# Patient Record
Sex: FEMALE | Race: WHITE | NOT HISPANIC OR LATINO | Employment: UNEMPLOYED | URBAN - NONMETROPOLITAN AREA
[De-identification: names, ages, dates, MRNs, and addresses within clinical notes are randomized per-mention and may not be internally consistent; named-entity substitution may affect disease eponyms.]

---

## 2021-10-06 ENCOUNTER — APPOINTMENT (EMERGENCY)
Dept: CT IMAGING | Facility: HOSPITAL | Age: 46
End: 2021-10-06
Payer: COMMERCIAL

## 2021-10-06 ENCOUNTER — APPOINTMENT (EMERGENCY)
Dept: RADIOLOGY | Facility: HOSPITAL | Age: 46
End: 2021-10-06
Payer: COMMERCIAL

## 2021-10-06 ENCOUNTER — HOSPITAL ENCOUNTER (EMERGENCY)
Facility: HOSPITAL | Age: 46
Discharge: HOME | End: 2021-10-06
Attending: EMERGENCY MEDICINE
Payer: COMMERCIAL

## 2021-10-06 VITALS
HEIGHT: 63 IN | RESPIRATION RATE: 18 BRPM | BODY MASS INDEX: 39.69 KG/M2 | TEMPERATURE: 98.1 F | DIASTOLIC BLOOD PRESSURE: 80 MMHG | HEART RATE: 61 BPM | OXYGEN SATURATION: 94 % | WEIGHT: 224 LBS | SYSTOLIC BLOOD PRESSURE: 172 MMHG

## 2021-10-06 DIAGNOSIS — R11.2 NAUSEA AND VOMITING: ICD-10-CM

## 2021-10-06 DIAGNOSIS — F12.10 MARIJUANA ABUSE: ICD-10-CM

## 2021-10-06 DIAGNOSIS — K29.70 GASTRITIS: Primary | ICD-10-CM

## 2021-10-06 LAB
ALBUMIN SERPL BCP-MCNC: 4.1 G/DL (ref 3.5–5)
ALP SERPL-CCNC: 71 U/L (ref 46–116)
ALT SERPL W P-5'-P-CCNC: 27 U/L (ref 12–78)
AMPHETAMINES SERPL QL SCN: NEGATIVE
ANION GAP SERPL CALCULATED.3IONS-SCNC: 12 MMOL/L (ref 4–13)
AST SERPL W P-5'-P-CCNC: 20 U/L (ref 5–45)
BARBITURATES UR QL: NEGATIVE
BASOPHILS # BLD AUTO: 0.04 THOUSANDS/ΜL (ref 0–0.1)
BASOPHILS NFR BLD AUTO: 1 % (ref 0–1)
BENZODIAZ UR QL: NEGATIVE
BILIRUB SERPL-MCNC: 0.55 MG/DL (ref 0.2–1)
BILIRUB UR QL STRIP: ABNORMAL
BUN SERPL-MCNC: 34 MG/DL (ref 5–25)
CALCIUM SERPL-MCNC: 9.4 MG/DL (ref 8.3–10.1)
CHLORIDE SERPL-SCNC: 102 MMOL/L (ref 100–108)
CLARITY UR: CLEAR
CO2 SERPL-SCNC: 26 MMOL/L (ref 21–32)
COCAINE UR QL: NEGATIVE
COLOR UR: YELLOW
CREAT SERPL-MCNC: 1.16 MG/DL (ref 0.6–1.3)
EOSINOPHIL # BLD AUTO: 0.13 THOUSAND/ΜL (ref 0–0.61)
EOSINOPHIL NFR BLD AUTO: 2 % (ref 0–6)
ERYTHROCYTE [DISTWIDTH] IN BLOOD BY AUTOMATED COUNT: 18.6 % (ref 11.6–15.1)
GFR SERPL CREATININE-BSD FRML MDRD: 58 ML/MIN/1.73SQ M
GLUCOSE SERPL-MCNC: 107 MG/DL (ref 65–140)
GLUCOSE UR STRIP-MCNC: NEGATIVE MG/DL
HCT VFR BLD AUTO: 39.9 % (ref 34.8–46.1)
HGB BLD-MCNC: 12.4 G/DL (ref 11.5–15.4)
HGB UR QL STRIP.AUTO: NEGATIVE
IMM GRANULOCYTES # BLD AUTO: 0.01 THOUSAND/UL (ref 0–0.2)
IMM GRANULOCYTES NFR BLD AUTO: 0 % (ref 0–2)
KETONES UR STRIP-MCNC: ABNORMAL MG/DL
LACTATE SERPL-SCNC: 1.3 MMOL/L (ref 0.5–2)
LEUKOCYTE ESTERASE UR QL STRIP: NEGATIVE
LIPASE SERPL-CCNC: 91 U/L (ref 73–393)
LYMPHOCYTES # BLD AUTO: 1.9 THOUSANDS/ΜL (ref 0.6–4.47)
LYMPHOCYTES NFR BLD AUTO: 23 % (ref 14–44)
MAGNESIUM SERPL-MCNC: 2.7 MG/DL (ref 1.6–2.6)
MCH RBC QN AUTO: 25.2 PG (ref 26.8–34.3)
MCHC RBC AUTO-ENTMCNC: 31.1 G/DL (ref 31.4–37.4)
MCV RBC AUTO: 81 FL (ref 82–98)
METHADONE UR QL: NEGATIVE
MONOCYTES # BLD AUTO: 0.79 THOUSAND/ΜL (ref 0.17–1.22)
MONOCYTES NFR BLD AUTO: 10 % (ref 4–12)
NEUTROPHILS # BLD AUTO: 5.44 THOUSANDS/ΜL (ref 1.85–7.62)
NEUTS SEG NFR BLD AUTO: 64 % (ref 43–75)
NITRITE UR QL STRIP: NEGATIVE
NRBC BLD AUTO-RTO: 0 /100 WBCS
OPIATES UR QL SCN: NEGATIVE
OXYCODONE+OXYMORPHONE UR QL SCN: NEGATIVE
PCP UR QL: NEGATIVE
PH UR STRIP.AUTO: 6 [PH]
PLATELET # BLD AUTO: 450 THOUSANDS/UL (ref 149–390)
PMV BLD AUTO: 10.1 FL (ref 8.9–12.7)
POTASSIUM SERPL-SCNC: 3.9 MMOL/L (ref 3.5–5.3)
PROT SERPL-MCNC: 8.4 G/DL (ref 6.4–8.2)
PROT UR STRIP-MCNC: NEGATIVE MG/DL
RBC # BLD AUTO: 4.93 MILLION/UL (ref 3.81–5.12)
SODIUM SERPL-SCNC: 140 MMOL/L (ref 136–145)
SP GR UR STRIP.AUTO: 1.01 (ref 1–1.03)
THC UR QL: POSITIVE
UROBILINOGEN UR QL STRIP.AUTO: 0.2 E.U./DL
WBC # BLD AUTO: 8.31 THOUSAND/UL (ref 4.31–10.16)

## 2021-10-06 PROCEDURE — 96365 THER/PROPH/DIAG IV INF INIT: CPT

## 2021-10-06 PROCEDURE — 96376 TX/PRO/DX INJ SAME DRUG ADON: CPT

## 2021-10-06 PROCEDURE — 36415 COLL VENOUS BLD VENIPUNCTURE: CPT | Performed by: PHYSICIAN ASSISTANT

## 2021-10-06 PROCEDURE — 96375 TX/PRO/DX INJ NEW DRUG ADDON: CPT

## 2021-10-06 PROCEDURE — C9113 INJ PANTOPRAZOLE SODIUM, VIA: HCPCS | Performed by: PHYSICIAN ASSISTANT

## 2021-10-06 PROCEDURE — 85025 COMPLETE CBC W/AUTO DIFF WBC: CPT | Performed by: PHYSICIAN ASSISTANT

## 2021-10-06 PROCEDURE — 83690 ASSAY OF LIPASE: CPT | Performed by: PHYSICIAN ASSISTANT

## 2021-10-06 PROCEDURE — 71045 X-RAY EXAM CHEST 1 VIEW: CPT

## 2021-10-06 PROCEDURE — 83605 ASSAY OF LACTIC ACID: CPT | Performed by: PHYSICIAN ASSISTANT

## 2021-10-06 PROCEDURE — 74177 CT ABD & PELVIS W/CONTRAST: CPT

## 2021-10-06 PROCEDURE — 80307 DRUG TEST PRSMV CHEM ANLYZR: CPT | Performed by: PHYSICIAN ASSISTANT

## 2021-10-06 PROCEDURE — 80053 COMPREHEN METABOLIC PANEL: CPT | Performed by: PHYSICIAN ASSISTANT

## 2021-10-06 PROCEDURE — 99284 EMERGENCY DEPT VISIT MOD MDM: CPT

## 2021-10-06 PROCEDURE — 83735 ASSAY OF MAGNESIUM: CPT | Performed by: PHYSICIAN ASSISTANT

## 2021-10-06 PROCEDURE — 81003 URINALYSIS AUTO W/O SCOPE: CPT | Performed by: PHYSICIAN ASSISTANT

## 2021-10-06 PROCEDURE — 99284 EMERGENCY DEPT VISIT MOD MDM: CPT | Performed by: PHYSICIAN ASSISTANT

## 2021-10-06 RX ORDER — ONDANSETRON 4 MG/1
4 TABLET, ORALLY DISINTEGRATING ORAL EVERY 6 HOURS PRN
Qty: 20 TABLET | Refills: 0 | Status: SHIPPED | OUTPATIENT
Start: 2021-10-06

## 2021-10-06 RX ORDER — ONDANSETRON 2 MG/ML
4 INJECTION INTRAMUSCULAR; INTRAVENOUS ONCE
Status: COMPLETED | OUTPATIENT
Start: 2021-10-06 | End: 2021-10-06

## 2021-10-06 RX ADMIN — SODIUM CHLORIDE 1000 ML: 0.9 INJECTION, SOLUTION INTRAVENOUS at 18:44

## 2021-10-06 RX ADMIN — IOHEXOL 100 ML: 350 INJECTION, SOLUTION INTRAVENOUS at 19:41

## 2021-10-06 RX ADMIN — SODIUM CHLORIDE 80 MG: 9 INJECTION, SOLUTION INTRAVENOUS at 18:44

## 2021-10-06 RX ADMIN — ONDANSETRON 4 MG: 2 INJECTION INTRAMUSCULAR; INTRAVENOUS at 18:44

## 2021-10-06 RX ADMIN — ONDANSETRON 4 MG: 2 INJECTION INTRAMUSCULAR; INTRAVENOUS at 22:04

## 2022-08-28 ENCOUNTER — HOSPITAL ENCOUNTER (EMERGENCY)
Facility: HOSPITAL | Age: 47
Discharge: HOME | End: 2022-08-28
Attending: EMERGENCY MEDICINE
Payer: COMMERCIAL

## 2022-08-28 VITALS
OXYGEN SATURATION: 96 % | WEIGHT: 220 LBS | BODY MASS INDEX: 38.98 KG/M2 | DIASTOLIC BLOOD PRESSURE: 82 MMHG | SYSTOLIC BLOOD PRESSURE: 136 MMHG | HEIGHT: 63 IN | TEMPERATURE: 97.8 F | RESPIRATION RATE: 18 BRPM | HEART RATE: 70 BPM

## 2022-08-28 DIAGNOSIS — F12.90 CANNABINOID HYPEREMESIS SYNDROME: Primary | ICD-10-CM

## 2022-08-28 DIAGNOSIS — R11.2 CANNABINOID HYPEREMESIS SYNDROME: Primary | ICD-10-CM

## 2022-08-28 LAB
ALBUMIN SERPL BCP-MCNC: 4.5 G/DL (ref 3.5–5)
ALP SERPL-CCNC: 78 U/L (ref 46–116)
ALT SERPL W P-5'-P-CCNC: 17 U/L (ref 12–78)
ANION GAP SERPL CALCULATED.3IONS-SCNC: 12 MMOL/L (ref 4–13)
ANION GAP SERPL CALCULATED.3IONS-SCNC: 15 MMOL/L (ref 4–13)
AST SERPL W P-5'-P-CCNC: 8 U/L (ref 5–45)
BASOPHILS # BLD AUTO: 0.04 THOUSANDS/ΜL (ref 0–0.1)
BASOPHILS NFR BLD AUTO: 0 % (ref 0–1)
BETA-HYDROXYBUTYRATE: 0.5 MMOL/L
BILIRUB SERPL-MCNC: 0.51 MG/DL (ref 0.2–1)
BUN SERPL-MCNC: 45 MG/DL (ref 5–25)
BUN SERPL-MCNC: 56 MG/DL (ref 5–25)
CALCIUM SERPL-MCNC: 8.1 MG/DL (ref 8.3–10.1)
CALCIUM SERPL-MCNC: 9.6 MG/DL (ref 8.3–10.1)
CHLORIDE SERPL-SCNC: 104 MMOL/L (ref 96–108)
CHLORIDE SERPL-SCNC: 98 MMOL/L (ref 96–108)
CO2 SERPL-SCNC: 23 MMOL/L (ref 21–32)
CO2 SERPL-SCNC: 23 MMOL/L (ref 21–32)
CREAT SERPL-MCNC: 1.06 MG/DL (ref 0.6–1.3)
CREAT SERPL-MCNC: 1.57 MG/DL (ref 0.6–1.3)
EOSINOPHIL # BLD AUTO: 0.02 THOUSAND/ΜL (ref 0–0.61)
EOSINOPHIL NFR BLD AUTO: 0 % (ref 0–6)
ERYTHROCYTE [DISTWIDTH] IN BLOOD BY AUTOMATED COUNT: 19.5 % (ref 11.6–15.1)
GFR SERPL CREATININE-BSD FRML MDRD: 39 ML/MIN/1.73SQ M
GFR SERPL CREATININE-BSD FRML MDRD: 64 ML/MIN/1.73SQ M
GLUCOSE SERPL-MCNC: 105 MG/DL (ref 65–140)
GLUCOSE SERPL-MCNC: 133 MG/DL (ref 65–140)
GLUCOSE SERPL-MCNC: 144 MG/DL (ref 65–140)
HCT VFR BLD AUTO: 41.2 % (ref 34.8–46.1)
HGB BLD-MCNC: 12.4 G/DL (ref 11.5–15.4)
IMM GRANULOCYTES # BLD AUTO: 0.03 THOUSAND/UL (ref 0–0.2)
IMM GRANULOCYTES NFR BLD AUTO: 0 % (ref 0–2)
LIPASE SERPL-CCNC: 147 U/L (ref 73–393)
LYMPHOCYTES # BLD AUTO: 1.13 THOUSANDS/ΜL (ref 0.6–4.47)
LYMPHOCYTES NFR BLD AUTO: 12 % (ref 14–44)
MCH RBC QN AUTO: 22.5 PG (ref 26.8–34.3)
MCHC RBC AUTO-ENTMCNC: 30.1 G/DL (ref 31.4–37.4)
MCV RBC AUTO: 75 FL (ref 82–98)
MONOCYTES # BLD AUTO: 0.63 THOUSAND/ΜL (ref 0.17–1.22)
MONOCYTES NFR BLD AUTO: 7 % (ref 4–12)
NEUTROPHILS # BLD AUTO: 7.63 THOUSANDS/ΜL (ref 1.85–7.62)
NEUTS SEG NFR BLD AUTO: 81 % (ref 43–75)
NRBC BLD AUTO-RTO: 0 /100 WBCS
PLATELET # BLD AUTO: 564 THOUSANDS/UL (ref 149–390)
PMV BLD AUTO: 8.8 FL (ref 8.9–12.7)
POTASSIUM SERPL-SCNC: 3.5 MMOL/L (ref 3.5–5.3)
POTASSIUM SERPL-SCNC: 3.8 MMOL/L (ref 3.5–5.3)
PROT SERPL-MCNC: 9.8 G/DL (ref 6.4–8.4)
RBC # BLD AUTO: 5.51 MILLION/UL (ref 3.81–5.12)
SARS-COV-2 RNA RESP QL NAA+PROBE: NEGATIVE
SODIUM SERPL-SCNC: 136 MMOL/L (ref 135–147)
SODIUM SERPL-SCNC: 139 MMOL/L (ref 135–147)
WBC # BLD AUTO: 9.48 THOUSAND/UL (ref 4.31–10.16)

## 2022-08-28 PROCEDURE — 96375 TX/PRO/DX INJ NEW DRUG ADDON: CPT

## 2022-08-28 PROCEDURE — 82948 REAGENT STRIP/BLOOD GLUCOSE: CPT

## 2022-08-28 PROCEDURE — 99283 EMERGENCY DEPT VISIT LOW MDM: CPT

## 2022-08-28 PROCEDURE — U0003 INFECTIOUS AGENT DETECTION BY NUCLEIC ACID (DNA OR RNA); SEVERE ACUTE RESPIRATORY SYNDROME CORONAVIRUS 2 (SARS-COV-2) (CORONAVIRUS DISEASE [COVID-19]), AMPLIFIED PROBE TECHNIQUE, MAKING USE OF HIGH THROUGHPUT TECHNOLOGIES AS DESCRIBED BY CMS-2020-01-R: HCPCS | Performed by: EMERGENCY MEDICINE

## 2022-08-28 PROCEDURE — 99285 EMERGENCY DEPT VISIT HI MDM: CPT | Performed by: EMERGENCY MEDICINE

## 2022-08-28 PROCEDURE — 96376 TX/PRO/DX INJ SAME DRUG ADON: CPT

## 2022-08-28 PROCEDURE — 83690 ASSAY OF LIPASE: CPT | Performed by: EMERGENCY MEDICINE

## 2022-08-28 PROCEDURE — U0005 INFEC AGEN DETEC AMPLI PROBE: HCPCS | Performed by: EMERGENCY MEDICINE

## 2022-08-28 PROCEDURE — 96365 THER/PROPH/DIAG IV INF INIT: CPT

## 2022-08-28 PROCEDURE — 36415 COLL VENOUS BLD VENIPUNCTURE: CPT | Performed by: EMERGENCY MEDICINE

## 2022-08-28 PROCEDURE — 82010 KETONE BODYS QUAN: CPT | Performed by: EMERGENCY MEDICINE

## 2022-08-28 PROCEDURE — 96361 HYDRATE IV INFUSION ADD-ON: CPT

## 2022-08-28 PROCEDURE — 85025 COMPLETE CBC W/AUTO DIFF WBC: CPT | Performed by: EMERGENCY MEDICINE

## 2022-08-28 PROCEDURE — 80048 BASIC METABOLIC PNL TOTAL CA: CPT | Performed by: EMERGENCY MEDICINE

## 2022-08-28 PROCEDURE — C9113 INJ PANTOPRAZOLE SODIUM, VIA: HCPCS | Performed by: EMERGENCY MEDICINE

## 2022-08-28 PROCEDURE — 80053 COMPREHEN METABOLIC PANEL: CPT | Performed by: EMERGENCY MEDICINE

## 2022-08-28 RX ORDER — METOCLOPRAMIDE 10 MG/1
10 TABLET ORAL EVERY 6 HOURS PRN
Qty: 30 TABLET | Refills: 1 | Status: SHIPPED | OUTPATIENT
Start: 2022-08-28 | End: 2022-08-28 | Stop reason: SDUPTHER

## 2022-08-28 RX ORDER — HALOPERIDOL 5 MG/ML
2 INJECTION INTRAMUSCULAR ONCE
Status: COMPLETED | OUTPATIENT
Start: 2022-08-28 | End: 2022-08-28

## 2022-08-28 RX ORDER — HYDROXYZINE 50 MG/1
50 TABLET, FILM COATED ORAL 4 TIMES DAILY PRN
COMMUNITY
Start: 2022-04-29

## 2022-08-28 RX ORDER — OLANZAPINE 5 MG/1
TABLET ORAL
COMMUNITY
Start: 2022-07-28

## 2022-08-28 RX ORDER — PANTOPRAZOLE SODIUM 40 MG/10ML
40 INJECTION, POWDER, LYOPHILIZED, FOR SOLUTION INTRAVENOUS ONCE
Status: COMPLETED | OUTPATIENT
Start: 2022-08-28 | End: 2022-08-28

## 2022-08-28 RX ORDER — LAMOTRIGINE 200 MG/1
200 TABLET ORAL
COMMUNITY

## 2022-08-28 RX ORDER — TRAZODONE HYDROCHLORIDE 50 MG/1
50 TABLET ORAL
COMMUNITY

## 2022-08-28 RX ORDER — METOCLOPRAMIDE 10 MG/1
10 TABLET ORAL EVERY 6 HOURS PRN
Qty: 30 TABLET | Refills: 0 | Status: SHIPPED | OUTPATIENT
Start: 2022-08-28

## 2022-08-28 RX ORDER — DEXLANSOPRAZOLE 60 MG/1
60 CAPSULE, DELAYED RELEASE ORAL DAILY
COMMUNITY

## 2022-08-28 RX ORDER — POLYETHYLENE GLYCOL 3350 17 G/17G
17 POWDER, FOR SOLUTION ORAL DAILY
COMMUNITY
Start: 2022-06-03

## 2022-08-28 RX ORDER — HALOPERIDOL 5 MG/1
5 TABLET ORAL DAILY
COMMUNITY
Start: 2022-06-03

## 2022-08-28 RX ADMIN — PANTOPRAZOLE SODIUM 40 MG: 40 INJECTION, POWDER, FOR SOLUTION INTRAVENOUS at 08:52

## 2022-08-28 RX ADMIN — SODIUM CHLORIDE, SODIUM LACTATE, POTASSIUM CHLORIDE, AND CALCIUM CHLORIDE 1000 ML: .6; .31; .03; .02 INJECTION, SOLUTION INTRAVENOUS at 10:19

## 2022-08-28 RX ADMIN — SODIUM CHLORIDE 1000 ML: 0.9 INJECTION, SOLUTION INTRAVENOUS at 08:53

## 2022-08-28 RX ADMIN — HALOPERIDOL LACTATE 2 MG: 5 INJECTION, SOLUTION INTRAMUSCULAR at 08:52

## 2022-08-28 RX ADMIN — HALOPERIDOL LACTATE 2 MG: 5 INJECTION, SOLUTION INTRAMUSCULAR at 10:23

## 2022-08-28 NOTE — ED PROVIDER NOTES
History  Chief Complaint   Patient presents with    Vomiting     Pt is with history of vomiting  Pt states she was told prior it was in relation to her marijuana use       Patient is a 65-year-old chronic marijuana user who presents emergency room with chief complaint of persistent vomiting for several weeks  Patient continues to smoke marijuana per her own report  Patient reports to me that she has a history of mental health disorder  She uses marijuana  Is recreational not prescribed  Patient reports that she has been seen for candidiasis related hyperemesis before  She reports medications either are not helping or she is unable to tolerate them orally  Patient reports that she had been on Zofran and it had not helped  She reports that she was on Haldol and initially helped but now she cannot take the oral medications reports that she is not eaten in 3 weeks    She is complaining of some upper epigastric discomfort  Patient reports to me that she has a history of diabetes for which she was supposed to be on metformin but is not currently taking it  Patient is very tearful and appears uncomfortable  Patient reports thirst and feeling dehydrated    Patient is found running her head underneath the sink with warm water  She reports that this helps        History provided by:  Patient and spouse  Vomiting  Severity:  Severe  Timing:  Constant  Number of daily episodes:  Unable to specify  Quality:  Unable to specify  Able to tolerate:  Liquids and solids  Progression:  Worsening  Chronicity:  Chronic  Recent urination:  Decreased  Relieved by:  Nothing  Worsened by:  Food smell  Ineffective treatments:  Antiemetics  Associated symptoms: abdominal pain and myalgias    Associated symptoms: no arthralgias, no chills, no diarrhea, no fever and no headaches    Abdominal pain:     Location:  Epigastric    Quality: burning      Severity:  Unable to specify    Onset quality:  Unable to specify  Risk factors: diabetes    Risk factors: no alcohol use (Reports no current history ) and no sick contacts        Prior to Admission Medications   Prescriptions Last Dose Informant Patient Reported? Taking? OLANZapine (ZyPREXA) 5 mg tablet   Yes Yes   dexlansoprazole (DEXILANT) 60 MG capsule   Yes No   Sig: Take 60 mg by mouth daily   haloperidol (HALDOL) 5 mg tablet   Yes Yes   Sig: Take 5 mg by mouth daily   hydrOXYzine HCL (ATARAX) 50 mg tablet   Yes Yes   Sig: Take 50 mg by mouth 4 (four) times a day as needed   lamoTRIgine (LaMICtal) 200 MG tablet   Yes No   Sig: Take 200 mg by mouth   ondansetron (Zofran ODT) 4 mg disintegrating tablet   No No   Sig: Take 1 tablet (4 mg total) by mouth every 6 (six) hours as needed for nausea or vomiting   polyethylene glycol (GLYCOLAX) 17 GM/SCOOP powder   Yes Yes   Sig: Take 17 g by mouth daily   traZODone (DESYREL) 50 mg tablet   Yes No   Sig: Take 50 mg by mouth      Facility-Administered Medications: None       History reviewed  No pertinent past medical history  History reviewed  No pertinent surgical history  History reviewed  No pertinent family history  I have reviewed and agree with the history as documented  E-Cigarette/Vaping    E-Cigarette Use Never User      E-Cigarette/Vaping Substances     Social History     Tobacco Use    Smoking status: Former Smoker     Packs/day: 1 00     Types: Cigarettes    Smokeless tobacco: Never Used   Vaping Use    Vaping Use: Never used   Substance Use Topics    Alcohol use: Not Currently    Drug use: Yes     Types: Marijuana       Review of Systems   Constitutional: Positive for fatigue and unexpected weight change  Negative for activity change, chills, diaphoresis and fever  Eyes: Negative  Respiratory: Negative  Cardiovascular: Negative  Gastrointestinal: Positive for abdominal pain and vomiting  Negative for constipation, diarrhea and nausea  Endocrine: Positive for polydipsia   Negative for polyphagia and polyuria  Genitourinary: Negative for dysuria, flank pain and frequency  Musculoskeletal: Positive for myalgias  Negative for arthralgias and back pain  Skin: Negative  Negative for pallor and rash  Allergic/Immunologic: Negative  Neurological: Negative for headaches  Hematological: Negative  Psychiatric/Behavioral: Negative  All other systems reviewed and are negative  Physical Exam  Physical Exam  Vitals and nursing note reviewed  Constitutional:       General: She is awake  She is in acute distress  Appearance: Normal appearance  She is well-developed  She is obese  She is ill-appearing  She is not toxic-appearing  HENT:      Head: Normocephalic and atraumatic  Hair is normal       Jaw: No pain on movement  Right Ear: External ear normal       Left Ear: External ear normal       Nose: Nose normal    Eyes:      General: Lids are normal  No scleral icterus  Extraocular Movements: Extraocular movements intact  Pupils: Pupils are equal, round, and reactive to light  Cardiovascular:      Rate and Rhythm: Normal rate and regular rhythm  Heart sounds: Normal heart sounds  No murmur heard  Pulmonary:      Effort: Pulmonary effort is normal  No respiratory distress  Breath sounds: Normal breath sounds  No stridor  No wheezing or rales  Abdominal:      General: Abdomen is flat  There is no distension  Palpations: Abdomen is soft  Tenderness: There is abdominal tenderness in the epigastric area  There is no guarding  Musculoskeletal:         General: No deformity  Normal range of motion  Cervical back: Normal range of motion and neck supple  Skin:     General: Skin is warm and dry  Coloration: Skin is not jaundiced or pale  Findings: No rash  Neurological:      Mental Status: She is alert and oriented to person, place, and time  Mental status is at baseline  Cranial Nerves: No cranial nerve deficit     Psychiatric: Attention and Perception: Attention normal          Mood and Affect: Mood normal          Speech: Speech normal          Behavior: Behavior normal          Vital Signs  ED Triage Vitals   Temperature Pulse Respirations Blood Pressure SpO2   08/28/22 0836 08/28/22 0836 08/28/22 0836 08/28/22 0836 08/28/22 0836   97 8 °F (36 6 °C) 80 16 (!) 176/102 98 %      Temp src Heart Rate Source Patient Position - Orthostatic VS BP Location FiO2 (%)   -- 08/28/22 1024 -- 08/28/22 0836 --    Monitor  Right arm       Pain Score       08/28/22 0836       No Pain           Vitals:    08/28/22 0836 08/28/22 1024   BP: (!) 176/102 145/80   Pulse: 80 71         Visual Acuity      ED Medications  Medications   sodium chloride 0 9 % bolus 1,000 mL (0 mL Intravenous Stopped 8/28/22 1019)   pantoprazole (PROTONIX) injection 40 mg (40 mg Intravenous Given 8/28/22 0852)   haloperidol lactate (HALDOL) injection 2 mg (2 mg Intravenous Given 8/28/22 0852)   lactated ringers bolus 1,000 mL (1,000 mL Intravenous New Bag 8/28/22 1019)   haloperidol lactate (HALDOL) injection 2 mg (2 mg Intravenous Given 8/28/22 1023)       Diagnostic Studies  Results Reviewed     Procedure Component Value Units Date/Time    Basic metabolic panel [[id number]]  (Abnormal) Collected: 08/28/22 1154    Lab Status: Final result Specimen: Blood from Arm, Left Updated: 08/28/22 1236     Sodium 139 mmol/L      Potassium 3 5 mmol/L      Chloride 104 mmol/L      CO2 23 mmol/L      ANION GAP 12 mmol/L      BUN 45 mg/dL      Creatinine 1 06 mg/dL      Glucose 105 mg/dL      Calcium 8 1 mg/dL      eGFR 64 ml/min/1 73sq m     Narrative:      [facility] guidelines for Chronic Kidney Disease (CKD):     Stage 1 with normal or high GFR (GFR > 90 mL/min/1 73 square meters)    Stage 2 Mild CKD (GFR = 60-89 mL/min/1 73 square meters)    Stage 3A Moderate CKD (GFR = 45-59 mL/min/1 73 square meters)    Stage 3B Moderate CKD (GFR = 30-44 mL/min/1 73 square meters)    Stage 4 Severe CKD (GFR = 15-29 mL/min/1 73 square meters)    Stage 5 End Stage CKD (GFR <15 mL/min/1 73 square meters)  Note: GFR calculation is accurate only with a steady state creatinine    COVID only [[id number]]  (Normal) Collected: 08/28/22 0852    Lab Status: Final result Specimen: Nares from Nose Updated: 08/28/22 0951     SARS-CoV-2 Negative    Narrative:      FOR PEDIATRIC PATIENTS - copy/paste COVID Guidelines URL to browser: [email or web address]  [employer]    SARS-CoV-2 assay is a Nucleic Acid Amplification assay intended for the  qualitative detection of nucleic acid from SARS-CoV-2 in nasopharyngeal  swabs  Results are for the presumptive identification of SARS-CoV-2 RNA  Positive results are indicative of infection with SARS-CoV-2, the virus  causing COVID-19, but do not rule out bacterial infection or co-infection  with other viruses  Laboratories within the [facility] and its  territories are required to report all positive results to the appropriate  public health authorities  Negative results do not preclude SARS-CoV-2  infection and should not be used as the sole basis for treatment or other  patient management decisions  Negative results must be combined with  clinical observations, patient history, and epidemiological information  This test has not been FDA cleared or approved  This test has been authorized by FDA under an Emergency Use Authorization  (EUA)  This test is only authorized for the duration of time the  declaration that circumstances exist justifying the authorization of the  emergency use of an in vitro diagnostic tests for detection of SARS-CoV-2  virus and/or diagnosis of COVID-19 infection under section 564(b)(1) of  the Act, 21 U  S C  080ASC-8(C)(4), unless the authorization is terminated  or revoked sooner  The test has been validated but independent review by FDA  and CLIA is pending      Test performed using [employer] Gene[employer]pert: This RT-PCR assay targets N2,  a region unique to SARS-CoV-2  A conserved region in the E-gene was chosen  for pan-Sarbecovirus detection which includes SARS-CoV-2      Beta Hydroxybutyrate [[id number]]  (Normal) Collected: 08/28/22 0852    Lab Status: Final result Specimen: Blood from Arm, Left Updated: 08/28/22 0942     BETA-HYDROXYBUTYRATE 0 5 mmol/L     Comprehensive metabolic panel [[id number]]  (Abnormal) Collected: 08/28/22 0852    Lab Status: Final result Specimen: Blood from Arm, Left Updated: 08/28/22 0917     Sodium 136 mmol/L      Potassium 3 8 mmol/L      Chloride 98 mmol/L      CO2 23 mmol/L      ANION GAP 15 mmol/L      BUN 56 mg/dL      Creatinine 1 57 mg/dL      Glucose 144 mg/dL      Calcium 9 6 mg/dL      AST 8 U/L      ALT 17 U/L      Alkaline Phosphatase 78 U/L      Total Protein 9 8 g/dL      Albumin 4 5 g/dL      Total Bilirubin 0 51 mg/dL      eGFR 39 ml/min/1 73sq m     Narrative:      [place] guidelines for Chronic Kidney Disease (CKD):     Stage 1 with normal or high GFR (GFR > 90 mL/min/1 73 square meters)    Stage 2 Mild CKD (GFR = 60-89 mL/min/1 73 square meters)    Stage 3A Moderate CKD (GFR = 45-59 mL/min/1 73 square meters)    Stage 3B Moderate CKD (GFR = 30-44 mL/min/1 73 square meters)    Stage 4 Severe CKD (GFR = 15-29 mL/min/1 73 square meters)    Stage 5 End Stage CKD (GFR <15 mL/min/1 73 square meters)  Note: GFR calculation is accurate only with a steady state creatinine    Lipase [[id number]]  (Normal) Collected: 08/28/22 0852    Lab Status: Final result Specimen: Blood from Arm, Left Updated: 08/28/22 0917     Lipase 147 u/L     Fingerstick Glucose (POCT) [[id number]]  (Normal) Collected: 08/28/22 0857    Lab Status: Final result Updated: 08/28/22 0859     POC Glucose 133 mg/dl     CBC and differential [[id number]]  (Abnormal) Collected: 08/28/22 0852    Lab Status: Final result Specimen: Blood from Arm, Left Updated: 08/28/22 0859     WBC 9 48 Thousand/uL      RBC 5 51 Million/uL      Hemoglobin 12 4 g/dL      Hematocrit 41 2 %      MCV 75 fL      MCH 22 5 pg      MCHC 30 1 g/dL      RDW 19 5 %      MPV 8 8 fL      Platelets 664 Thousands/uL      nRBC 0 /100 WBCs      Neutrophils Relative 81 %      Immat GRANS % 0 %      Lymphocytes Relative 12 %      Monocytes Relative 7 %      Eosinophils Relative 0 %      Basophils Relative 0 %      Neutrophils Absolute 7 63 Thousands/µL      Immature Grans Absolute 0 03 Thousand/uL      Lymphocytes Absolute 1 13 Thousands/µL      Monocytes Absolute 0 63 Thousand/µL      Eosinophils Absolute 0 02 Thousand/µL      Basophils Absolute 0 04 Thousands/µL                  No orders to display              Procedures  Procedures         ED Course  ED Course as of 08/28/22 1243   Sun Aug 28, 2022   0945 Blood glucose 133     0923 Currently appears slightly more comfortable  0191 Lipase: 147   0924 Renal function and anion gap noted  Will continue rehydration and recheck labs  8017 Patient appears clinically improved  No further vomiting  Will rehydrate and recheck labs  1204 SARS-COV-2: Negative   1237 Labs improved with IV rehydration  Patient stable for discharge  Will change her medications to Reglan  1243 Discussed findings with patient and spouse at bedside  Patient will follow-up PCP start a light diet for next 24 hours then slowly advanced  SBIRT 20yo+    Flowsheet Row Most Recent Value   SBIRT (23 yo +)    In order to provide better care to our patients, we are screening all of our patients for alcohol and drug use  Would it be okay to ask you these screening questions? Yes Filed at: 08/28/2022 0455   Initial Alcohol Screen: US AUDIT-C     1  How often do you have a drink containing alcohol? 0 Filed at: 08/28/2022 0839   2  How many drinks containing alcohol do you have on a typical day you are drinking? 0 Filed at: 08/28/2022 0839   3a  Male UNDER 65:  How often do you have five or more drinks on one occasion? 0 Filed at: 08/28/2022 0839   3b  FEMALE Any Age, or MALE 65+: How often do you have 4 or more drinks on one occassion? 0 Filed at: 08/28/2022 0839   Audit-C Score 0 Filed at: 08/28/2022 0165   LEONARDO: How many times in the past year have you    Used an illegal drug or used a prescription medication for non-medical reasons? Never Filed at: 08/28/2022 2413                    MDM  Number of Diagnoses or Management Options  Cannabinoid hyperemesis syndrome: new and requires workup     Amount and/or Complexity of Data Reviewed  Clinical lab tests: ordered and reviewed  Decide to obtain previous medical records or to obtain history from someone other than the patient: yes    Risk of Complications, Morbidity, and/or Mortality  Presenting problems: moderate  Diagnostic procedures: moderate  Management options: moderate    Patient Progress  Patient progress: improved      Disposition  Final diagnoses:   Cannabinoid hyperemesis syndrome     Time reflects when diagnosis was documented in both MDM as applicable and the Disposition within this note     Time User Action Codes Description Comment    8/28/2022 12:38 PM Reji Gore Add [R11 2,  F12 90] Cannabinoid hyperemesis syndrome       ED Disposition     ED Disposition   Discharge    Condition   Stable    Date/Time   Sun Aug 28, 2022 12:37 PM    Comment   Joelle Dixon discharge to home/self care                 Follow-up Information     Follow up With Specialties Details Why [address] Yazan Justin, Nurse Practitioner In 2 weeks As needed [address]  [place] [address]  [phone]            Current Discharge Medication List      START taking these medications    Details   metoclopramide (Reglan) 10 mg tablet Take 1 tablet (10 mg total) by mouth every 6 (six) hours as needed (Nausea or vomiting)  Qty: 30 tablet, Refills: 0    Associated Diagnoses: Cannabinoid hyperemesis syndrome         CONTINUE these medications which have NOT CHANGED    Details   haloperidol (HALDOL) 5 mg tablet Take 5 mg by mouth daily      hydrOXYzine HCL (ATARAX) 50 mg tablet Take 50 mg by mouth 4 (four) times a day as needed      OLANZapine (ZyPREXA) 5 mg tablet       polyethylene glycol (GLYCOLAX) 17 GM/SCOOP powder Take 17 g by mouth daily      dexlansoprazole (DEXILANT) 60 MG capsule Take 60 mg by mouth daily      lamoTRIgine (LaMICtal) 200 MG tablet Take 200 mg by mouth      traZODone (DESYREL) 50 mg tablet Take 50 mg by mouth         STOP taking these medications       ondansetron (Zofran ODT) 4 mg disintegrating tablet Comments:   Reason for Stopping:               No discharge procedures on file      PDMP Review     None          ED Provider  Electronically Signed by           Cliff Herrera DO  08/28/22 [id number] Marcelo Wright DO  08/28/22 1542

## 2022-11-30 ENCOUNTER — HOSPITAL ENCOUNTER (INPATIENT)
Facility: HOSPITAL | Age: 47
LOS: 3 days | Discharge: HOME | End: 2022-12-03
Attending: EMERGENCY MEDICINE | Admitting: FAMILY MEDICINE

## 2022-11-30 DIAGNOSIS — N17.9 AKI (ACUTE KIDNEY INJURY) (HCC): Primary | ICD-10-CM

## 2022-11-30 DIAGNOSIS — K59.00 CONSTIPATION: ICD-10-CM

## 2022-11-30 DIAGNOSIS — F31.9 BIPOLAR 1 DISORDER (HCC): ICD-10-CM

## 2022-11-30 DIAGNOSIS — R11.2 CANNABINOID HYPEREMESIS SYNDROME: ICD-10-CM

## 2022-11-30 DIAGNOSIS — I16.9 HYPERTENSIVE CRISIS: ICD-10-CM

## 2022-11-30 DIAGNOSIS — F12.90 CANNABINOID HYPEREMESIS SYNDROME: ICD-10-CM

## 2022-11-30 PROBLEM — D75.839 THROMBOCYTOSIS: Status: ACTIVE | Noted: 2022-11-30

## 2022-11-30 PROBLEM — E87.1 ACUTE HYPONATREMIA: Status: ACTIVE | Noted: 2022-11-30

## 2022-11-30 LAB
ALBUMIN SERPL BCP-MCNC: 4.9 G/DL (ref 3.5–5)
ALP SERPL-CCNC: 84 U/L (ref 46–116)
ALT SERPL W P-5'-P-CCNC: 20 U/L (ref 12–78)
ANION GAP SERPL CALCULATED.3IONS-SCNC: 17 MMOL/L (ref 4–13)
APTT PPP: 25 SECONDS (ref 23–37)
AST SERPL W P-5'-P-CCNC: 12 U/L (ref 5–45)
ATRIAL RATE: 72 BPM
B-HCG SERPL-ACNC: <1 MIU/ML (ref 0–11.6)
BASOPHILS # BLD AUTO: 0.04 THOUSANDS/ÂΜL (ref 0–0.1)
BASOPHILS NFR BLD AUTO: 0 % (ref 0–1)
BILIRUB SERPL-MCNC: 0.49 MG/DL (ref 0.2–1)
BUN SERPL-MCNC: 48 MG/DL (ref 5–25)
CALCIUM SERPL-MCNC: 9.7 MG/DL (ref 8.3–10.1)
CHLORIDE SERPL-SCNC: 93 MMOL/L (ref 96–108)
CO2 SERPL-SCNC: 22 MMOL/L (ref 21–32)
CREAT SERPL-MCNC: 2.15 MG/DL (ref 0.6–1.3)
EOSINOPHIL # BLD AUTO: 0 THOUSAND/ÂΜL (ref 0–0.61)
EOSINOPHIL NFR BLD AUTO: 0 % (ref 0–6)
ERYTHROCYTE [DISTWIDTH] IN BLOOD BY AUTOMATED COUNT: 19.9 % (ref 11.6–15.1)
GFR SERPL CREATININE-BSD FRML MDRD: 27 ML/MIN/1.73SQ M
GLUCOSE SERPL-MCNC: 143 MG/DL (ref 65–140)
HCT VFR BLD AUTO: 42.7 % (ref 34.8–46.1)
HGB BLD-MCNC: 13.1 G/DL (ref 11.5–15.4)
IMM GRANULOCYTES # BLD AUTO: 0.04 THOUSAND/UL (ref 0–0.2)
IMM GRANULOCYTES NFR BLD AUTO: 0 % (ref 0–2)
INR PPP: 1 (ref 0.84–1.19)
LACTATE SERPL-SCNC: 1.8 MMOL/L (ref 0.5–2)
LIPASE SERPL-CCNC: 156 U/L (ref 73–393)
LYMPHOCYTES # BLD AUTO: 1.82 THOUSANDS/ÂΜL (ref 0.6–4.47)
LYMPHOCYTES NFR BLD AUTO: 14 % (ref 14–44)
MCH RBC QN AUTO: 22.5 PG (ref 26.8–34.3)
MCHC RBC AUTO-ENTMCNC: 30.7 G/DL (ref 31.4–37.4)
MCV RBC AUTO: 74 FL (ref 82–98)
MONOCYTES # BLD AUTO: 1.19 THOUSAND/ÂΜL (ref 0.17–1.22)
MONOCYTES NFR BLD AUTO: 9 % (ref 4–12)
NEUTROPHILS # BLD AUTO: 10.12 THOUSANDS/ÂΜL (ref 1.85–7.62)
NEUTS SEG NFR BLD AUTO: 77 % (ref 43–75)
NRBC BLD AUTO-RTO: 0 /100 WBCS
P AXIS: -16 DEGREES
PLATELET # BLD AUTO: 601 THOUSANDS/UL (ref 149–390)
PMV BLD AUTO: 8.9 FL (ref 8.9–12.7)
POTASSIUM SERPL-SCNC: 3.9 MMOL/L (ref 3.5–5.3)
PR INTERVAL: 160 MS
PROT SERPL-MCNC: 10.6 G/DL (ref 6.4–8.4)
PROTHROMBIN TIME: 13.3 SECONDS (ref 11.6–14.5)
QRS AXIS: -19 DEGREES
QRSD INTERVAL: 80 MS
QT INTERVAL: 378 MS
QTC INTERVAL: 413 MS
RBC # BLD AUTO: 5.81 MILLION/UL (ref 3.81–5.12)
SODIUM SERPL-SCNC: 132 MMOL/L (ref 135–147)
T WAVE AXIS: -10 DEGREES
VENTRICULAR RATE: 72 BPM
WBC # BLD AUTO: 13.21 THOUSAND/UL (ref 4.31–10.16)

## 2022-11-30 RX ORDER — PANTOPRAZOLE SODIUM 40 MG/1
40 TABLET, DELAYED RELEASE ORAL
Status: DISCONTINUED | OUTPATIENT
Start: 2022-12-01 | End: 2022-12-03 | Stop reason: HOSPADM

## 2022-11-30 RX ORDER — TRAZODONE HYDROCHLORIDE 50 MG/1
50 TABLET ORAL
Status: DISCONTINUED | OUTPATIENT
Start: 2022-11-30 | End: 2022-12-03 | Stop reason: HOSPADM

## 2022-11-30 RX ORDER — METOCLOPRAMIDE HYDROCHLORIDE 5 MG/ML
10 INJECTION INTRAMUSCULAR; INTRAVENOUS ONCE
Status: COMPLETED | OUTPATIENT
Start: 2022-11-30 | End: 2022-11-30

## 2022-11-30 RX ORDER — DIPHENHYDRAMINE HYDROCHLORIDE 50 MG/ML
25 INJECTION INTRAMUSCULAR; INTRAVENOUS ONCE
Status: COMPLETED | OUTPATIENT
Start: 2022-11-30 | End: 2022-11-30

## 2022-11-30 RX ORDER — PANTOPRAZOLE SODIUM 40 MG/10ML
40 INJECTION, POWDER, LYOPHILIZED, FOR SOLUTION INTRAVENOUS ONCE
Status: COMPLETED | OUTPATIENT
Start: 2022-11-30 | End: 2022-11-30

## 2022-11-30 RX ORDER — HALOPERIDOL 5 MG/ML
5 INJECTION INTRAMUSCULAR ONCE
Status: COMPLETED | OUTPATIENT
Start: 2022-11-30 | End: 2022-11-30

## 2022-11-30 RX ORDER — LAMOTRIGINE 100 MG/1
200 TABLET ORAL
Status: DISCONTINUED | OUTPATIENT
Start: 2022-11-30 | End: 2022-12-03 | Stop reason: HOSPADM

## 2022-11-30 RX ORDER — SODIUM CHLORIDE, SODIUM GLUCONATE, SODIUM ACETATE, POTASSIUM CHLORIDE, MAGNESIUM CHLORIDE, SODIUM PHOSPHATE, DIBASIC, AND POTASSIUM PHOSPHATE .53; .5; .37; .037; .03; .012; .00082 G/100ML; G/100ML; G/100ML; G/100ML; G/100ML; G/100ML; G/100ML
125 INJECTION, SOLUTION INTRAVENOUS CONTINUOUS
Status: DISCONTINUED | OUTPATIENT
Start: 2022-11-30 | End: 2022-12-01

## 2022-11-30 RX ORDER — LORAZEPAM 2 MG/ML
1 INJECTION INTRAMUSCULAR EVERY 6 HOURS PRN
Status: DISCONTINUED | OUTPATIENT
Start: 2022-11-30 | End: 2022-12-03 | Stop reason: HOSPADM

## 2022-11-30 RX ORDER — HYDROXYZINE HYDROCHLORIDE 25 MG/1
50 TABLET, FILM COATED ORAL 4 TIMES DAILY PRN
Status: DISCONTINUED | OUTPATIENT
Start: 2022-11-30 | End: 2022-12-03 | Stop reason: HOSPADM

## 2022-11-30 RX ORDER — ENOXAPARIN SODIUM 100 MG/ML
40 INJECTION SUBCUTANEOUS DAILY
Status: DISCONTINUED | OUTPATIENT
Start: 2022-12-01 | End: 2022-12-03 | Stop reason: HOSPADM

## 2022-11-30 RX ORDER — HALOPERIDOL 5 MG/1
5 TABLET ORAL DAILY
Status: DISCONTINUED | OUTPATIENT
Start: 2022-12-01 | End: 2022-11-30

## 2022-11-30 RX ORDER — ONDANSETRON 2 MG/ML
4 INJECTION INTRAMUSCULAR; INTRAVENOUS EVERY 4 HOURS PRN
Status: DISCONTINUED | OUTPATIENT
Start: 2022-11-30 | End: 2022-12-01

## 2022-11-30 RX ORDER — LORAZEPAM 2 MG/ML
1 INJECTION INTRAMUSCULAR EVERY 6 HOURS PRN
Status: DISCONTINUED | OUTPATIENT
Start: 2022-11-30 | End: 2022-11-30

## 2022-11-30 RX ORDER — HYDROMORPHONE HCL/PF 1 MG/ML
0.5 SYRINGE (ML) INJECTION ONCE
Status: COMPLETED | OUTPATIENT
Start: 2022-11-30 | End: 2022-11-30

## 2022-11-30 RX ORDER — OLANZAPINE 2.5 MG/1
5 TABLET ORAL
Status: DISCONTINUED | OUTPATIENT
Start: 2022-11-30 | End: 2022-12-02

## 2022-11-30 RX ADMIN — SODIUM CHLORIDE 1000 ML: 0.9 INJECTION, SOLUTION INTRAVENOUS at 21:35

## 2022-11-30 RX ADMIN — SODIUM CHLORIDE, SODIUM GLUCONATE, SODIUM ACETATE, POTASSIUM CHLORIDE, MAGNESIUM CHLORIDE, SODIUM PHOSPHATE, DIBASIC, AND POTASSIUM PHOSPHATE 125 ML/HR: .53; .5; .37; .037; .03; .012; .00082 INJECTION, SOLUTION INTRAVENOUS at 23:32

## 2022-11-30 RX ADMIN — HYDROMORPHONE HYDROCHLORIDE 0.5 MG: 1 INJECTION, SOLUTION INTRAMUSCULAR; INTRAVENOUS; SUBCUTANEOUS at 22:29

## 2022-11-30 RX ADMIN — TRAZODONE HYDROCHLORIDE 50 MG: 50 TABLET ORAL at 22:28

## 2022-11-30 RX ADMIN — SODIUM CHLORIDE 1000 ML: 0.9 INJECTION, SOLUTION INTRAVENOUS at 18:43

## 2022-11-30 RX ADMIN — PANTOPRAZOLE SODIUM 40 MG: 40 INJECTION, POWDER, FOR SOLUTION INTRAVENOUS at 18:46

## 2022-11-30 RX ADMIN — HALOPERIDOL LACTATE 5 MG: 5 INJECTION, SOLUTION INTRAMUSCULAR at 22:29

## 2022-11-30 RX ADMIN — LAMOTRIGINE 200 MG: 100 TABLET ORAL at 22:28

## 2022-11-30 RX ADMIN — OLANZAPINE 5 MG: 2.5 TABLET, FILM COATED ORAL at 22:28

## 2022-11-30 RX ADMIN — DIPHENHYDRAMINE HYDROCHLORIDE 25 MG: 50 INJECTION, SOLUTION INTRAMUSCULAR; INTRAVENOUS at 18:47

## 2022-11-30 RX ADMIN — HALOPERIDOL LACTATE 5 MG: 5 INJECTION, SOLUTION INTRAMUSCULAR at 18:49

## 2022-11-30 RX ADMIN — METOCLOPRAMIDE 10 MG: 5 INJECTION, SOLUTION INTRAMUSCULAR; INTRAVENOUS at 22:29

## 2022-11-30 RX ADMIN — DIPHENHYDRAMINE HYDROCHLORIDE 25 MG: 50 INJECTION, SOLUTION INTRAMUSCULAR; INTRAVENOUS at 22:29

## 2022-11-30 NOTE — ED PROVIDER NOTES
History  Chief Complaint   Patient presents with   • Vomiting     Pt has CHS and has been vomiting x several days  Pt last smoked at 0800 today  Pt has pain everywhere  Patient with a history of cannabinoid hyperemesis syndrome  Has been smoking marijuana  Now with nausea and vomiting for several days  Feels weak and tired  Has been here in the past for same  No recent cold symptoms  History provided by:  Patient   used: No    Vomiting  Severity:  Mild  Timing:  Constant  Progression:  Unchanged  Chronicity:  Recurrent  Recent urination:  Normal  Relieved by:  Nothing  Worsened by:  Nothing  Ineffective treatments:  None tried  Associated symptoms: abdominal pain    Associated symptoms: no chills, no cough, no diarrhea, no fever, no headaches and no sore throat        Prior to Admission Medications   Prescriptions Last Dose Informant Patient Reported? Taking? OLANZapine (ZyPREXA) 5 mg tablet   Yes No   dexlansoprazole (DEXILANT) 60 MG capsule   Yes No   Sig: Take 60 mg by mouth daily   haloperidol (HALDOL) 5 mg tablet   Yes No   Sig: Take 5 mg by mouth daily   hydrOXYzine HCL (ATARAX) 50 mg tablet   Yes No   Sig: Take 50 mg by mouth 4 (four) times a day as needed   lamoTRIgine (LaMICtal) 200 MG tablet   Yes No   Sig: Take 200 mg by mouth   metoclopramide (Reglan) 10 mg tablet   No No   Sig: Take 1 tablet (10 mg total) by mouth every 6 (six) hours as needed (Nausea or vomiting)   polyethylene glycol (GLYCOLAX) 17 GM/SCOOP powder   Yes No   Sig: Take 17 g by mouth daily   traZODone (DESYREL) 50 mg tablet   Yes No   Sig: Take 50 mg by mouth      Facility-Administered Medications: None       History reviewed  No pertinent past medical history  History reviewed  No pertinent surgical history  History reviewed  No pertinent family history  I have reviewed and agree with the history as documented      E-Cigarette/Vaping   • E-Cigarette Use Never User      E-Cigarette/Vaping Substances     Social History     Tobacco Use   • Smoking status: Former     Packs/day: 1 00     Types: Cigarettes   • Smokeless tobacco: Never   Vaping Use   • Vaping Use: Never used   Substance Use Topics   • Alcohol use: Not Currently   • Drug use: Yes     Types: Marijuana       Review of Systems   Constitutional: Negative for chills and fever  HENT: Negative for ear pain, hearing loss, sore throat, trouble swallowing and voice change  Eyes: Negative for pain and discharge  Respiratory: Negative for cough, shortness of breath and wheezing  Cardiovascular: Negative for chest pain and palpitations  Gastrointestinal: Positive for abdominal pain and vomiting  Negative for blood in stool, constipation, diarrhea and nausea  Genitourinary: Negative for dysuria, flank pain, frequency and hematuria  Musculoskeletal: Negative for joint swelling, neck pain and neck stiffness  Skin: Negative for rash and wound  Neurological: Negative for dizziness, seizures, syncope, facial asymmetry and headaches  Psychiatric/Behavioral: Negative for hallucinations, self-injury and suicidal ideas  All other systems reviewed and are negative  Physical Exam  Physical Exam  Vitals and nursing note reviewed  Constitutional:       General: She is not in acute distress  Appearance: She is well-developed  HENT:      Head: Normocephalic and atraumatic  Right Ear: External ear normal       Left Ear: External ear normal    Eyes:      General: No scleral icterus  Right eye: No discharge  Left eye: No discharge  Extraocular Movements: Extraocular movements intact  Conjunctiva/sclera: Conjunctivae normal    Cardiovascular:      Rate and Rhythm: Normal rate and regular rhythm  Heart sounds: Normal heart sounds  No murmur heard  Pulmonary:      Effort: Pulmonary effort is normal       Breath sounds: Normal breath sounds  No wheezing or rales     Abdominal:      General: Bowel sounds are normal  There is no distension  Palpations: Abdomen is soft  Tenderness: There is abdominal tenderness (Minimally tender in the epigastric region  )  There is no guarding or rebound  Musculoskeletal:         General: No deformity  Normal range of motion  Cervical back: Normal range of motion and neck supple  Skin:     General: Skin is warm and dry  Findings: No rash  Neurological:      General: No focal deficit present  Mental Status: She is alert and oriented to person, place, and time  Cranial Nerves: No cranial nerve deficit  Psychiatric:         Mood and Affect: Mood normal          Behavior: Behavior normal          Thought Content:  Thought content normal          Judgment: Judgment normal          Vital Signs  ED Triage Vitals [11/30/22 1747]   Temperature Pulse Respirations Blood Pressure SpO2   98 5 °F (36 9 °C) 90 19 (!) 189/114 96 %      Temp Source Heart Rate Source Patient Position - Orthostatic VS BP Location FiO2 (%)   Temporal Monitor Sitting Right arm --      Pain Score       10 - Worst Possible Pain           Vitals:    11/30/22 1747   BP: (!) 189/114   Pulse: 90   Patient Position - Orthostatic VS: Sitting         Visual Acuity      ED Medications  Medications   sodium chloride 0 9 % bolus 1,000 mL (1,000 mL Intravenous New Bag 11/30/22 1843)   pantoprazole (PROTONIX) injection 40 mg (40 mg Intravenous Given 11/30/22 1846)   haloperidol lactate (HALDOL) injection 5 mg (5 mg Intramuscular Given 11/30/22 1849)   diphenhydrAMINE (BENADRYL) injection 25 mg (25 mg Intravenous Given 11/30/22 1847)       Diagnostic Studies  Results Reviewed     Procedure Component Value Units Date/Time    hCG, quantitative [[id number]]  (Normal) Collected: 11/30/22 1845    Lab Status: Final result Specimen: Blood from Arm, Left Updated: 11/30/22 1941     HCG, Quant <1 mIU/mL     Narrative:       Expected Ranges:     Approximate               Approximate HCG  Gestation age Concentration ( mIU/mL)  _____________          ______________________   Ileene Barter                      HCG values  0 2-1                       5-50  1-2                           2-3                         100-5000  3-4                         500-13546  4-5                         1000-38015  5-6                         38986-528106  6-8                         42083-580289  8-12                        25842-313810      Protime-INR [[id number]]  (Normal) Collected: 11/30/22 1845    Lab Status: Final result Specimen: Blood from Arm, Left Updated: 11/30/22 1918     Protime 13 3 seconds      INR 1 00    APTT [[id number]]  (Normal) Collected: 11/30/22 1845    Lab Status: Final result Specimen: Blood from Arm, Left Updated: 11/30/22 1918     PTT 25 seconds     Lactic acid [[id number]]  (Normal) Collected: 11/30/22 1845    Lab Status: Final result Specimen: Blood from Arm, Left Updated: 11/30/22 1917     LACTIC ACID 1 8 mmol/L     Narrative:      Result may be elevated if tourniquet was used during collection      Comprehensive metabolic panel [[id number]]  (Abnormal) Collected: 11/30/22 1845    Lab Status: Final result Specimen: Blood from Arm, Left Updated: 11/30/22 1916     Sodium 132 mmol/L      Potassium 3 9 mmol/L      Chloride 93 mmol/L      CO2 22 mmol/L      ANION GAP 17 mmol/L      BUN 48 mg/dL      Creatinine 2 15 mg/dL      Glucose 143 mg/dL      Calcium 9 7 mg/dL      AST 12 U/L      ALT 20 U/L      Alkaline Phosphatase 84 U/L      Total Protein 10 6 g/dL      Albumin 4 9 g/dL      Total Bilirubin 0 49 mg/dL      eGFR 27 ml/min/1 73sq m     Narrative:      [facility] guidelines for Chronic Kidney Disease (CKD):   •  Stage 1 with normal or high GFR (GFR > 90 mL/min/1 73 square meters)  •  Stage 2 Mild CKD (GFR = 60-89 mL/min/1 73 square meters)  •  Stage 3A Moderate CKD (GFR = 45-59 mL/min/1 73 square meters)  •  Stage 3B Moderate CKD (GFR = 30-44 mL/min/1 73 square meters)  •  Stage 4 Severe CKD (GFR = 15-29 mL/min/1 73 square meters)  •  Stage 5 End Stage CKD (GFR <15 mL/min/1 73 square meters)  Note: GFR calculation is accurate only with a steady state creatinine    Lipase [[id number]]  (Normal) Collected: 11/30/22 1845    Lab Status: Final result Specimen: Blood from Arm, Left Updated: 11/30/22 1916     Lipase 156 u/L     CBC and differential [[id number]]  (Abnormal) Collected: 11/30/22 1845    Lab Status: Final result Specimen: Blood from Arm, Left Updated: 11/30/22 1855     WBC 13 21 Thousand/uL      RBC 5 81 Million/uL      Hemoglobin 13 1 g/dL      Hematocrit 42 7 %      MCV 74 fL      MCH 22 5 pg      MCHC 30 7 g/dL      RDW 19 9 %      MPV 8 9 fL      Platelets 161 Thousands/uL      nRBC 0 /100 WBCs      Neutrophils Relative 77 %      Immat GRANS % 0 %      Lymphocytes Relative 14 %      Monocytes Relative 9 %      Eosinophils Relative 0 %      Basophils Relative 0 %      Neutrophils Absolute 10 12 Thousands/µL      Immature Grans Absolute 0 04 Thousand/uL      Lymphocytes Absolute 1 82 Thousands/µL      Monocytes Absolute 1 19 Thousand/µL      Eosinophils Absolute 0 00 Thousand/µL      Basophils Absolute 0 04 Thousands/µL                  No orders to display              Procedures  ECG 12 Lead Documentation Only    Date/Time: 11/30/2022 5:57 PM  Performed by: Lazarus Christine, MD  Authorized by: Lazarus Christine, MD     ECG reviewed by me, the ED Provider: yes    Patient location:  ED  Rate:     ECG rate:  70  Rhythm:     Rhythm: sinus rhythm    Ectopy:     Ectopy: none    QRS:     QRS axis:  Normal             ED Course                               SBIRT 20yo+    Flowsheet Row Most Recent Value   SBIRT (25 yo +)    In order to provide better care to our patients, we are screening all of our patients for alcohol and drug use  Would it be okay to ask you these screening questions?  No Filed at: 11/30/2022 1749                    MDM  Number of Diagnoses or Management Options     Amount and/or Complexity of Data Reviewed  Clinical lab tests: reviewed  Review and summarize past medical records: yes        Disposition  Final diagnoses:   LEY[facility] (acute [facility]) ([facility] )   Cannabinoid hyperemesis syndrome     Time reflects when diagnosis was documented in both MDM as applicable and the Disposition within this note     Time User Action Codes Description Comment    11/30/2022  7:21 PM Jose Price Add [N17 9] LEYDA (acute kidney injury) ([facility] )     11/30/2022  7:22 PM Luis Alfredo Price Add [R11 2,  F12 90] Cannabinoid hyperemesis syndrome       ED Disposition     ED Disposition   Admit    Condition   Stable    Date/Time   Wed Nov 30, 2022  7:23 PM    Comment   Case was discussed with Dr Lul Rice and the patient's admission status was agreed to be  to the service of Dr Lul Rice   Follow-up Information    None         Patient's Medications   Discharge Prescriptions    No medications on file       No discharge procedures on file      PDMP Review     None          ED Provider  Electronically Signed by           Joanie Orellana MD  11/30/22 6728

## 2022-12-01 PROBLEM — F31.9 BIPOLAR 1 DISORDER (HCC): Status: ACTIVE | Noted: 2022-12-01

## 2022-12-01 PROBLEM — F19.10 POLYSUBSTANCE ABUSE (HCC): Status: ACTIVE | Noted: 2022-12-01

## 2022-12-01 PROBLEM — R93.89 IMAGING ABNORMALITIES: Status: ACTIVE | Noted: 2022-12-01

## 2022-12-01 PROBLEM — T21.23XA: Status: ACTIVE | Noted: 2022-12-01

## 2022-12-01 PROBLEM — Z87.09 HISTORY OF COPD: Status: ACTIVE | Noted: 2022-12-01

## 2022-12-01 PROBLEM — Z86.39 HISTORY OF DIET-CONTROLLED DIABETES: Status: ACTIVE | Noted: 2022-12-01

## 2022-12-01 PROBLEM — Z86.39 HISTORY OF HYPERLIPIDEMIA: Status: ACTIVE | Noted: 2022-12-01

## 2022-12-01 LAB
AMORPH URATE CRY URNS QL MICRO: ABNORMAL /HPF
ANION GAP SERPL CALCULATED.3IONS-SCNC: 10 MMOL/L (ref 4–13)
BACTERIA UR QL AUTO: ABNORMAL /HPF
BILIRUB UR QL STRIP: ABNORMAL
BUN SERPL-MCNC: 38 MG/DL (ref 5–25)
CALCIUM SERPL-MCNC: 8.2 MG/DL (ref 8.3–10.1)
CAOX CRY URNS QL MICRO: ABNORMAL /HPF
CHLORIDE SERPL-SCNC: 102 MMOL/L (ref 96–108)
CLARITY UR: CLEAR
CO2 SERPL-SCNC: 24 MMOL/L (ref 21–32)
COLOR UR: YELLOW
CREAT SERPL-MCNC: 1.04 MG/DL (ref 0.6–1.3)
ERYTHROCYTE [DISTWIDTH] IN BLOOD BY AUTOMATED COUNT: 18.9 % (ref 11.6–15.1)
GFR SERPL CREATININE-BSD FRML MDRD: 65 ML/MIN/1.73SQ M
GLUCOSE SERPL-MCNC: 108 MG/DL (ref 65–140)
GLUCOSE UR STRIP-MCNC: NEGATIVE MG/DL
HCT VFR BLD AUTO: 33.9 % (ref 34.8–46.1)
HGB BLD-MCNC: 10.4 G/DL (ref 11.5–15.4)
HGB UR QL STRIP.AUTO: NEGATIVE
HYALINE CASTS #/AREA URNS LPF: ABNORMAL /LPF
KETONES UR STRIP-MCNC: NEGATIVE MG/DL
LEUKOCYTE ESTERASE UR QL STRIP: NEGATIVE
MAGNESIUM SERPL-MCNC: 2.5 MG/DL (ref 1.6–2.6)
MCH RBC QN AUTO: 23 PG (ref 26.8–34.3)
MCHC RBC AUTO-ENTMCNC: 30.7 G/DL (ref 31.4–37.4)
MCV RBC AUTO: 75 FL (ref 82–98)
MUCOUS THREADS UR QL AUTO: ABNORMAL
NITRITE UR QL STRIP: NEGATIVE
NON-SQ EPI CELLS URNS QL MICRO: ABNORMAL /HPF
PH UR STRIP.AUTO: 5.5 [PH]
PLATELET # BLD AUTO: 348 THOUSANDS/UL (ref 149–390)
PMV BLD AUTO: 8.9 FL (ref 8.9–12.7)
POTASSIUM SERPL-SCNC: 3.6 MMOL/L (ref 3.5–5.3)
PROT UR STRIP-MCNC: ABNORMAL MG/DL
RBC # BLD AUTO: 4.53 MILLION/UL (ref 3.81–5.12)
RBC #/AREA URNS AUTO: ABNORMAL /HPF
SODIUM SERPL-SCNC: 136 MMOL/L (ref 135–147)
SP GR UR STRIP.AUTO: >=1.03 (ref 1–1.03)
UROBILINOGEN UR QL STRIP.AUTO: 0.2 E.U./DL
WBC # BLD AUTO: 8.46 THOUSAND/UL (ref 4.31–10.16)
WBC #/AREA URNS AUTO: ABNORMAL /HPF

## 2022-12-01 RX ORDER — METOCLOPRAMIDE HYDROCHLORIDE 5 MG/ML
10 INJECTION INTRAMUSCULAR; INTRAVENOUS EVERY 6 HOURS PRN
Status: DISCONTINUED | OUTPATIENT
Start: 2022-12-01 | End: 2022-12-02

## 2022-12-01 RX ORDER — IPRATROPIUM BROMIDE AND ALBUTEROL SULFATE 2.5; .5 MG/3ML; MG/3ML
3 SOLUTION RESPIRATORY (INHALATION) EVERY 6 HOURS PRN
Status: DISCONTINUED | OUTPATIENT
Start: 2022-12-01 | End: 2022-12-03 | Stop reason: HOSPADM

## 2022-12-01 RX ORDER — GINSENG 100 MG
1 CAPSULE ORAL 2 TIMES DAILY
Status: DISCONTINUED | OUTPATIENT
Start: 2022-12-01 | End: 2022-12-03 | Stop reason: HOSPADM

## 2022-12-01 RX ADMIN — ONDANSETRON 4 MG: 2 INJECTION INTRAMUSCULAR; INTRAVENOUS at 09:08

## 2022-12-01 RX ADMIN — SODIUM CHLORIDE, SODIUM GLUCONATE, SODIUM ACETATE, POTASSIUM CHLORIDE, MAGNESIUM CHLORIDE, SODIUM PHOSPHATE, DIBASIC, AND POTASSIUM PHOSPHATE 125 ML/HR: .53; .5; .37; .037; .03; .012; .00082 INJECTION, SOLUTION INTRAVENOUS at 06:30

## 2022-12-01 RX ADMIN — BACITRACIN 1 LARGE APPLICATION: 500 OINTMENT TOPICAL at 16:36

## 2022-12-01 RX ADMIN — TRAZODONE HYDROCHLORIDE 50 MG: 50 TABLET ORAL at 21:13

## 2022-12-01 RX ADMIN — PANTOPRAZOLE SODIUM 40 MG: 40 TABLET, DELAYED RELEASE ORAL at 06:24

## 2022-12-01 RX ADMIN — HYDROXYZINE HYDROCHLORIDE 50 MG: 25 TABLET ORAL at 06:24

## 2022-12-01 RX ADMIN — METOCLOPRAMIDE HYDROCHLORIDE 10 MG: 5 INJECTION INTRAMUSCULAR; INTRAVENOUS at 18:40

## 2022-12-01 RX ADMIN — BACITRACIN 1 LARGE APPLICATION: 500 OINTMENT TOPICAL at 08:26

## 2022-12-01 RX ADMIN — LAMOTRIGINE 200 MG: 100 TABLET ORAL at 21:13

## 2022-12-01 RX ADMIN — METOCLOPRAMIDE HYDROCHLORIDE 10 MG: 5 INJECTION INTRAMUSCULAR; INTRAVENOUS at 12:02

## 2022-12-01 RX ADMIN — ONDANSETRON 4 MG: 2 INJECTION INTRAMUSCULAR; INTRAVENOUS at 02:48

## 2022-12-01 RX ADMIN — OLANZAPINE 5 MG: 2.5 TABLET, FILM COATED ORAL at 21:13
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
## 2022-12-01 NOTE — ASSESSMENT & PLAN NOTE
· Presents with acute nausea and vomiting, abdominal pain  · History cannabinoid hyperemesis syndrome with multiple hospitalizations in past  · Reports smoking marijuana for mental health but does not have a medical card  · Cessation discussed, patient is in agreement  · Continue antiemetics will change to Reglan  · Pain control  · Advance diet as tolerated  · Recent weight loss over 100 lb  · Him leave on clears she states she did then she had a small vomit will monitor throughout the day

## 2022-12-01 NOTE — ASSESSMENT & PLAN NOTE
· 189/114  · Secondary to acute pain in vomiting  · Resolved spontaneously  · Not maintained on oral antihypertensive medications  · Trend blood pressures

## 2022-12-01 NOTE — ED NOTES
Patient moved into an inpatient bed for comfort   Offers no other complaints or needs at this time       Nilda Mcadams, ABELINO  11/30/22 9307

## 2022-12-01 NOTE — ASSESSMENT & PLAN NOTE
· Creatinine 2 15  · Baseline creatinine 1 06  · Pre renal in setting of acute nausea and vomiting secondary to CHS  · IV hydration  · Trend creatinine  · Renally dose medication  · Check urinalysis

## 2022-12-01 NOTE — ASSESSMENT & PLAN NOTE
· Frequent marijuana use-does not have a medical marijuana card  · Vapes tobacco frequently  · Declines nicotine cessation medications  · Cessation discussion with patient regarding both marijuana and tobacco

## 2022-12-01 NOTE — ASSESSMENT & PLAN NOTE
· History of diet-controlled diabetes mellitus  · A1c was 5 8 in June  · Monitor BMP  · Outpatient follow-up
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
## 2022-12-01 NOTE — PLAN OF CARE
Problem: Potential for Falls  Goal: Patient will remain free of falls  Description: INTERVENTIONS:  - Educate patient/family on patient safety including physical limitations  - Instruct patient to call for assistance with activity   - Consult OT/PT to assist with strengthening/mobility   - Keep Call bell within reach  - Keep bed low and locked with side rails adjusted as appropriate  - Keep care items and personal belongings within reach  - Initiate and maintain comfort rounds  - Make Fall Risk Sign visible to staff  - Offer Toileting every 2 Hours, in advance of need  - Initiate/Maintain bed/chair alarm  - Obtain necessary fall risk management equipment:   - Apply yellow socks and bracelet for high fall risk patients  - Consider moving patient to room near nurses station  Outcome: Progressing     Problem: GASTROINTESTINAL - ADULT  Goal: Minimal or absence of nausea and/or vomiting  Description: INTERVENTIONS:  - Administer IV fluids if ordered to ensure adequate hydration  - Maintain NPO status until nausea and vomiting are resolved  - Nasogastric tube if ordered  - Administer ordered antiemetic medications as needed  - Provide nonpharmacologic comfort measures as appropriate  - Advance diet as tolerated, if ordered  - Consider nutrition services referral to assist patient with adequate nutrition and appropriate food choices  Outcome: Progressing  Goal: Maintains or returns to baseline bowel function  Description: INTERVENTIONS:  - Assess bowel function  - Encourage oral fluids to ensure adequate hydration  - Administer IV fluids if ordered to ensure adequate hydration  - Administer ordered medications as needed  - Encourage mobilization and activity  - Consider nutritional services referral to assist patient with adequate nutrition and appropriate food choices  Outcome: Progressing  Goal: Maintains adequate nutritional intake  Description: INTERVENTIONS:  - Monitor percentage of each meal consumed  - Identify factors contributing to decreased intake, treat as appropriate  - Assist with meals as needed  - Monitor I&O, weight, and lab values if indicated  - Obtain nutrition services referral as needed  Outcome: Progressing  Goal: Oral mucous membranes remain intact  Description: INTERVENTIONS  - Assess oral mucosa and hygiene practices  - Implement preventative oral hygiene regimen  - Implement oral medicated treatments as ordered  - Initiate Nutrition services referral as needed  Outcome: Progressing     Problem: SAFETY ADULT  Goal: Patient will remain free of falls  Description: INTERVENTIONS:  - Educate patient/family on patient safety including physical limitations  - Instruct patient to call for assistance with activity   - Consult OT/PT to assist with strengthening/mobility   - Keep Call bell within reach  - Keep bed low and locked with side rails adjusted as appropriate  - Keep care items and personal belongings within reach  - Initiate and maintain comfort rounds  - Make Fall Risk Sign visible to staff  - Offer Toileting every 2 Hours, in advance of need  - Initiate/Maintain bed/chair alarm  - Obtain necessary fall risk management equipment:   - Apply yellow socks and bracelet for high fall risk patients  - Consider moving patient to room near nurses station  Outcome: Progressing  Goal: Maintain or return to baseline ADL function  Description: INTERVENTIONS:  -  Assess patient's ability to carry out ADLs; assess patient's baseline for ADL function and identify physical deficits which impact ability to perform ADLs (bathing, care of mouth/teeth, toileting, grooming, dressing, etc )  - Assess/evaluate cause of self-care deficits   - Assess range of motion  - Assess patient's mobility; develop plan if impaired  - Assess patient's need for assistive devices and provide as appropriate  - Encourage maximum independence but intervene and supervise when necessary  - Involve family in performance of ADLs  - Assess for home care needs following discharge   - Consider OT consult to assist with ADL evaluation and planning for discharge  - Provide patient education as appropriate  Outcome: Progressing  Goal: Maintains/Returns to pre admission functional level  Description: INTERVENTIONS:  - Perform BMAT or MOVE assessment daily    - Set and communicate daily mobility goal to care team and patient/family/caregiver  - Collaborate with rehabilitation services on mobility goals if consulted  - Perform Range of Motion 3 times a day  - Reposition patient every 2 hours    - Dangle patient 3 times a day  - Stand patient 3 times a day  - Ambulate patient 3 times a day  - Out of bed to chair 3 times a day   - Out of bed for meals 3 times a day  - Out of bed for toileting  - Record patient progress and toleration of activity level   Outcome: Progressing     Problem: DISCHARGE PLANNING  Goal: Discharge to home or other facility with appropriate resources  Description: INTERVENTIONS:  - Identify barriers to discharge w/patient and caregiver  - Arrange for needed discharge resources and transportation as appropriate  - Identify discharge learning needs (meds, wound care, etc )  - Arrange for interpretive services to assist at discharge as needed  - Refer to Case Management Department for coordinating discharge planning if the patient needs post-hospital services based on physician/advanced practitioner order or complex needs related to functional status, cognitive ability, or social support system  Outcome: Progressing

## 2022-12-01 NOTE — ASSESSMENT & PLAN NOTE
· Creatinine 2 15  · Baseline creatinine 1 06  · Resolved discontinue fluids and encourage p o  Fluid intake and will even clears she stated she ate it and she vomited  Will monitor throughout the day with encouragement of p o  Liquids    Repeat a BMP tomorrow

## 2022-12-01 NOTE — ASSESSMENT & PLAN NOTE
· Presents with acute nausea and vomiting, abdominal pain  · History cannabinoid hyperemesis syndrome with multiple hospitalizations in past  · Reports smoking marijuana for mental health but does not have a medical card  · Cessation discussed, patient is in agreement  · Continue antiemetics, IV hydration  · Pain control  · Advance diet as tolerated  · Recent weight loss over 100 lb
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
## 2022-12-01 NOTE — ASSESSMENT & PLAN NOTE
2 hypodensities are seen within the spleen measuring 1 7 and 1 2 cm  Differential includes  hemangioma, hamartoma, lymphangioma, or other  Recommend elective MRI with contrast for further evaluation

## 2022-12-02 ENCOUNTER — APPOINTMENT (INPATIENT)
Dept: CT IMAGING | Facility: HOSPITAL | Age: 47
End: 2022-12-02

## 2022-12-02 LAB
ANION GAP SERPL CALCULATED.3IONS-SCNC: 10 MMOL/L (ref 4–13)
BUN SERPL-MCNC: 29 MG/DL (ref 5–25)
CALCIUM SERPL-MCNC: 8.8 MG/DL (ref 8.3–10.1)
CHLORIDE SERPL-SCNC: 102 MMOL/L (ref 96–108)
CO2 SERPL-SCNC: 24 MMOL/L (ref 21–32)
CREAT SERPL-MCNC: 1.14 MG/DL (ref 0.6–1.3)
GFR SERPL CREATININE-BSD FRML MDRD: 58 ML/MIN/1.73SQ M
GLUCOSE SERPL-MCNC: 122 MG/DL (ref 65–140)
POTASSIUM SERPL-SCNC: 3.4 MMOL/L (ref 3.5–5.3)
SODIUM SERPL-SCNC: 136 MMOL/L (ref 135–147)

## 2022-12-02 RX ORDER — HALOPERIDOL 5 MG/ML
2 INJECTION INTRAMUSCULAR ONCE
Status: COMPLETED | OUTPATIENT
Start: 2022-12-02 | End: 2022-12-02

## 2022-12-02 RX ORDER — POTASSIUM CHLORIDE 14.9 MG/ML
20 INJECTION INTRAVENOUS
Status: DISCONTINUED | OUTPATIENT
Start: 2022-12-02 | End: 2022-12-02

## 2022-12-02 RX ORDER — ONDANSETRON 2 MG/ML
4 INJECTION INTRAMUSCULAR; INTRAVENOUS EVERY 6 HOURS PRN
Status: DISCONTINUED | OUTPATIENT
Start: 2022-12-02 | End: 2022-12-03 | Stop reason: HOSPADM

## 2022-12-02 RX ORDER — POTASSIUM CHLORIDE 20 MEQ/1
40 TABLET, EXTENDED RELEASE ORAL ONCE
Status: DISCONTINUED | OUTPATIENT
Start: 2022-12-02 | End: 2022-12-02

## 2022-12-02 RX ORDER — SODIUM CHLORIDE, SODIUM GLUCONATE, SODIUM ACETATE, POTASSIUM CHLORIDE, MAGNESIUM CHLORIDE, SODIUM PHOSPHATE, DIBASIC, AND POTASSIUM PHOSPHATE .53; .5; .37; .037; .03; .012; .00082 G/100ML; G/100ML; G/100ML; G/100ML; G/100ML; G/100ML; G/100ML
50 INJECTION, SOLUTION INTRAVENOUS CONTINUOUS
Status: DISCONTINUED | OUTPATIENT
Start: 2022-12-02 | End: 2022-12-03

## 2022-12-02 RX ORDER — AMLODIPINE BESYLATE 5 MG/1
5 TABLET ORAL DAILY
Status: DISCONTINUED | OUTPATIENT
Start: 2022-12-02 | End: 2022-12-03 | Stop reason: HOSPADM

## 2022-12-02 RX ADMIN — HALOPERIDOL LACTATE 2 MG: 5 INJECTION, SOLUTION INTRAMUSCULAR at 09:43

## 2022-12-02 RX ADMIN — HYDROXYZINE HYDROCHLORIDE 50 MG: 25 TABLET ORAL at 21:50

## 2022-12-02 RX ADMIN — SODIUM CHLORIDE, SODIUM GLUCONATE, SODIUM ACETATE, POTASSIUM CHLORIDE AND MAGNESIUM CHLORIDE 50 ML/HR: 526; 502; 368; 37; 30 INJECTION, SOLUTION INTRAVENOUS at 10:19

## 2022-12-02 RX ADMIN — ONDANSETRON 4 MG: 2 INJECTION INTRAMUSCULAR; INTRAVENOUS at 21:51

## 2022-12-02 RX ADMIN — BACITRACIN 1 LARGE APPLICATION: 500 OINTMENT TOPICAL at 17:28

## 2022-12-02 RX ADMIN — HYDROXYZINE HYDROCHLORIDE 50 MG: 25 TABLET ORAL at 15:22

## 2022-12-02 RX ADMIN — BACITRACIN 1 LARGE APPLICATION: 500 OINTMENT TOPICAL at 09:45

## 2022-12-02 RX ADMIN — LAMOTRIGINE 200 MG: 100 TABLET ORAL at 21:51

## 2022-12-02 RX ADMIN — AMLODIPINE BESYLATE 5 MG: 5 TABLET ORAL at 15:22

## 2022-12-02 RX ADMIN — METOCLOPRAMIDE HYDROCHLORIDE 10 MG: 5 INJECTION INTRAMUSCULAR; INTRAVENOUS at 08:56

## 2022-12-02 RX ADMIN — TRAZODONE HYDROCHLORIDE 50 MG: 50 TABLET ORAL at 21:51

## 2022-12-02 RX ADMIN — PANTOPRAZOLE SODIUM 40 MG: 40 TABLET, DELAYED RELEASE ORAL at 05:27

## 2022-12-02 RX ADMIN — POTASSIUM CHLORIDE 20 MEQ: 14.9 INJECTION, SOLUTION INTRAVENOUS at 11:14

## 2022-12-02 RX ADMIN — LURASIDONE HYDROCHLORIDE 40 MG: 20 TABLET, FILM COATED ORAL at 16:57

## 2022-12-02 RX ADMIN — DIPHENHYDRAMINE HYDROCHLORIDE 25 MG: 50 INJECTION, SOLUTION INTRAMUSCULAR; INTRAVENOUS at 10:19

## 2022-12-02 NOTE — ASSESSMENT & PLAN NOTE
· 189/114  · Blood pressure still uncontrolled previous she was on Norvasc 5 mg daily then was taken off is her blood pressure has been controlled now it could be situational due to her vomiting but for now will place on 5 mg of Norvasc and see how she does

## 2022-12-02 NOTE — ASSESSMENT & PLAN NOTE
· Continue PTA Lamictal, Zyprexa, trazodone  · Outpatient follow-up  · I discussed with patient in detail when she goes into depression and other mental crisis she starts using Stephania Kluver 1 which makes her sick she thinks that time her mental situation is not controlling that with leading her to be overuse she is in agreement to see a psychiatrist to see if her medicines can be adjusted so she can prevent using marijuana in the future due to her mental illness  · Will consult Psychiatry

## 2022-12-02 NOTE — PLAN OF CARE
Problem: Potential for Falls  Goal: Patient will remain free of falls  Description: INTERVENTIONS:  - Educate patient/family on patient safety including physical limitations  - Instruct patient to call for assistance with activity   - Consult OT/PT to assist with strengthening/mobility   - Keep Call bell within reach  - Keep bed low and locked with side rails adjusted as appropriate  - Keep care items and personal belongings within reach  - Initiate and maintain comfort rounds  - Make Fall Risk Sign visible to staff  - Offer Toileting every 2 Hours, in advance of need  - Initiate/Maintain bed/chair alarm  - Obtain necessary fall risk management equipment:   - Apply yellow socks and bracelet for high fall risk patients  - Consider moving patient to room near nurses station  Outcome: Progressing     Problem: GASTROINTESTINAL - ADULT  Goal: Minimal or absence of nausea and/or vomiting  Description: INTERVENTIONS:  - Administer IV fluids if ordered to ensure adequate hydration  - Maintain NPO status until nausea and vomiting are resolved  - Nasogastric tube if ordered  - Administer ordered antiemetic medications as needed  - Provide nonpharmacologic comfort measures as appropriate  - Advance diet as tolerated, if ordered  - Consider nutrition services referral to assist patient with adequate nutrition and appropriate food choices  Outcome: Progressing  Goal: Maintains or returns to baseline bowel function  Description: INTERVENTIONS:  - Assess bowel function  - Encourage oral fluids to ensure adequate hydration  - Administer IV fluids if ordered to ensure adequate hydration  - Administer ordered medications as needed  - Encourage mobilization and activity  - Consider nutritional services referral to assist patient with adequate nutrition and appropriate food choices  Outcome: Progressing  Goal: Maintains adequate nutritional intake  Description: INTERVENTIONS:  - Monitor percentage of each meal consumed  - Identify factors contributing to decreased intake, treat as appropriate  - Assist with meals as needed  - Monitor I&O, weight, and lab values if indicated  - Obtain nutrition services referral as needed  Outcome: Progressing  Goal: Oral mucous membranes remain intact  Description: INTERVENTIONS  - Assess oral mucosa and hygiene practices  - Implement preventative oral hygiene regimen  - Implement oral medicated treatments as ordered  - Initiate Nutrition services referral as needed  Outcome: Progressing     Problem: SAFETY ADULT  Goal: Patient will remain free of falls  Description: INTERVENTIONS:  - Educate patient/family on patient safety including physical limitations  - Instruct patient to call for assistance with activity   - Consult OT/PT to assist with strengthening/mobility   - Keep Call bell within reach  - Keep bed low and locked with side rails adjusted as appropriate  - Keep care items and personal belongings within reach  - Initiate and maintain comfort rounds  - Make Fall Risk Sign visible to staff  - Offer Toileting every 2 Hours, in advance of need  - Initiate/Maintain bed/chair alarm  - Obtain necessary fall risk management equipment:   - Apply yellow socks and bracelet for high fall risk patients  - Consider moving patient to room near nurses station  Outcome: Progressing  Goal: Maintain or return to baseline ADL function  Description: INTERVENTIONS:  -  Assess patient's ability to carry out ADLs; assess patient's baseline for ADL function and identify physical deficits which impact ability to perform ADLs (bathing, care of mouth/teeth, toileting, grooming, dressing, etc )  - Assess/evaluate cause of self-care deficits   - Assess range of motion  - Assess patient's mobility; develop plan if impaired  - Assess patient's need for assistive devices and provide as appropriate  - Encourage maximum independence but intervene and supervise when necessary  - Involve family in performance of ADLs  - Assess for home care needs following discharge   - Consider OT consult to assist with ADL evaluation and planning for discharge  - Provide patient education as appropriate  Outcome: Progressing  Goal: Maintains/Returns to pre admission functional level  Description: INTERVENTIONS:  - Perform BMAT or MOVE assessment daily    - Set and communicate daily mobility goal to care team and patient/family/caregiver  - Collaborate with rehabilitation services on mobility goals if consulted  - Perform Range of Motion 3 times a day  - Reposition patient every 2 hours    - Dangle patient 3 times a day  - Stand patient 3 times a day  - Ambulate patient 3 times a day  - Out of bed to chair 3 times a day   - Out of bed for meals 3 times a day  - Out of bed for toileting  - Record patient progress and toleration of activity level   Outcome: Progressing

## 2022-12-02 NOTE — ASSESSMENT & PLAN NOTE
· Presents with acute nausea and vomiting, abdominal pain  · History cannabinoid hyperemesis syndrome with multiple hospitalizations in past  · Reports smoking marijuana for mental health but does not have a medical card  · Cessation discussed, patient is in agreement  · She states due to her uncontrolled depression and mental she starts using marijuana and she can not stop  · Will give a dose of Haldol and Benadryl as that seems to help she still having vomiting intermittent abdominal pain although abdominal exam is benign will leave her on clears start gentle hydration    Supplement electrolytes
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
## 2022-12-02 NOTE — ASSESSMENT & PLAN NOTE
· Creatinine 2 15  · Baseline creatinine 1 06  · That has resolved with fluids but patient still vomiting will place back on fluids isolate 50 cc to avoid recurrent
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
## 2022-12-03 VITALS
RESPIRATION RATE: 20 BRPM | HEIGHT: 63 IN | DIASTOLIC BLOOD PRESSURE: 68 MMHG | BODY MASS INDEX: 32.46 KG/M2 | HEART RATE: 79 BPM | SYSTOLIC BLOOD PRESSURE: 142 MMHG | TEMPERATURE: 99 F | WEIGHT: 183.2 LBS | OXYGEN SATURATION: 90 %

## 2022-12-03 LAB
ANION GAP SERPL CALCULATED.3IONS-SCNC: 7 MMOL/L (ref 4–13)
BASOPHILS # BLD AUTO: 0.04 THOUSANDS/ÂΜL (ref 0–0.1)
BASOPHILS NFR BLD AUTO: 1 % (ref 0–1)
BUN SERPL-MCNC: 21 MG/DL (ref 5–25)
CALCIUM SERPL-MCNC: 8.3 MG/DL (ref 8.3–10.1)
CHLORIDE SERPL-SCNC: 101 MMOL/L (ref 96–108)
CO2 SERPL-SCNC: 27 MMOL/L (ref 21–32)
CREAT SERPL-MCNC: 0.86 MG/DL (ref 0.6–1.3)
EOSINOPHIL # BLD AUTO: 0.2 THOUSAND/ÂΜL (ref 0–0.61)
EOSINOPHIL NFR BLD AUTO: 3 % (ref 0–6)
ERYTHROCYTE [DISTWIDTH] IN BLOOD BY AUTOMATED COUNT: 19.5 % (ref 11.6–15.1)
GFR SERPL CREATININE-BSD FRML MDRD: 82 ML/MIN/1.73SQ M
GLUCOSE SERPL-MCNC: 108 MG/DL (ref 65–140)
HCT VFR BLD AUTO: 38.2 % (ref 34.8–46.1)
HGB BLD-MCNC: 11.7 G/DL (ref 11.5–15.4)
IMM GRANULOCYTES # BLD AUTO: 0.02 THOUSAND/UL (ref 0–0.2)
IMM GRANULOCYTES NFR BLD AUTO: 0 % (ref 0–2)
LYMPHOCYTES # BLD AUTO: 2.77 THOUSANDS/ÂΜL (ref 0.6–4.47)
LYMPHOCYTES NFR BLD AUTO: 37 % (ref 14–44)
MCH RBC QN AUTO: 23.2 PG (ref 26.8–34.3)
MCHC RBC AUTO-ENTMCNC: 30.6 G/DL (ref 31.4–37.4)
MCV RBC AUTO: 76 FL (ref 82–98)
MONOCYTES # BLD AUTO: 0.87 THOUSAND/ÂΜL (ref 0.17–1.22)
MONOCYTES NFR BLD AUTO: 12 % (ref 4–12)
NEUTROPHILS # BLD AUTO: 3.6 THOUSANDS/ÂΜL (ref 1.85–7.62)
NEUTS SEG NFR BLD AUTO: 47 % (ref 43–75)
NRBC BLD AUTO-RTO: 0 /100 WBCS
PLATELET # BLD AUTO: 366 THOUSANDS/UL (ref 149–390)
PMV BLD AUTO: 8.6 FL (ref 8.9–12.7)
POTASSIUM SERPL-SCNC: 3.3 MMOL/L (ref 3.5–5.3)
RBC # BLD AUTO: 5.05 MILLION/UL (ref 3.81–5.12)
SODIUM SERPL-SCNC: 135 MMOL/L (ref 135–147)
WBC # BLD AUTO: 7.5 THOUSAND/UL (ref 4.31–10.16)

## 2022-12-03 RX ORDER — AMLODIPINE BESYLATE 5 MG/1
5 TABLET ORAL DAILY
Qty: 30 TABLET | Refills: 0 | Status: SHIPPED | OUTPATIENT
Start: 2022-12-03

## 2022-12-03 RX ORDER — POLYETHYLENE GLYCOL 3350 17 G/17G
17 POWDER, FOR SOLUTION ORAL DAILY PRN
Refills: 0
Start: 2022-12-03

## 2022-12-03 RX ORDER — ONDANSETRON 4 MG/1
4 TABLET, FILM COATED ORAL EVERY 8 HOURS PRN
Qty: 20 TABLET | Refills: 0 | Status: SHIPPED | OUTPATIENT
Start: 2022-12-03

## 2022-12-03 RX ADMIN — AMLODIPINE BESYLATE 5 MG: 5 TABLET ORAL at 07:49

## 2022-12-03 RX ADMIN — PANTOPRAZOLE SODIUM 40 MG: 40 TABLET, DELAYED RELEASE ORAL at 05:00

## 2022-12-03 RX ADMIN — SODIUM CHLORIDE, SODIUM GLUCONATE, SODIUM ACETATE, POTASSIUM CHLORIDE AND MAGNESIUM CHLORIDE 50 ML/HR: 526; 502; 368; 37; 30 INJECTION, SOLUTION INTRAVENOUS at 02:08

## 2022-12-03 RX ADMIN — IPRATROPIUM BROMIDE AND ALBUTEROL SULFATE 3 ML: 2.5; .5 SOLUTION RESPIRATORY (INHALATION) at 12:18

## 2022-12-03 RX ADMIN — HYDROXYZINE HYDROCHLORIDE 50 MG: 25 TABLET ORAL at 10:28

## 2022-12-03 RX ADMIN — BACITRACIN 1 LARGE APPLICATION: 500 OINTMENT TOPICAL at 07:53

## 2022-12-03 RX ADMIN — ONDANSETRON 4 MG: 2 INJECTION INTRAMUSCULAR; INTRAVENOUS at 09:51

## 2022-12-03 NOTE — ASSESSMENT & PLAN NOTE
· Creatinine 2 15  · Baseline creatinine 1 06  · Resolved patient able to tolerate a meal without further vomiting

## 2022-12-03 NOTE — ASSESSMENT & PLAN NOTE
· Presents with acute nausea and vomiting, abdominal pain  · History cannabinoid hyperemesis syndrome with multiple hospitalizations in past  · Reports smoking marijuana for mental health but does not have a medical card  · Cessation discussed, patient is in agreement  · She states due to her uncontrolled depression and mental she starts using marijuana and she can not stop  · Improved she was able to tolerate a meal without any vomiting or abdominal pain  I discussed with her to avoid marijuana as much as she can if needed Haldol as needed will also give her Zofran p r n  If needed  She is scheduled to have that psychotherapy done    Will discharge home

## 2022-12-03 NOTE — ASSESSMENT & PLAN NOTE
· Continue PTA Lamictal, Zyprexa, trazodone  · Outpatient follow-up  · I discussed with patient in detail when she goes into depression and other mental crisis she starts using Shan Rosalba 1 which makes her sick she thinks that time her mental situation is not controlling that with leading her to be overuse she is in agreement to see a psychiatrist to see if her medicines can be adjusted so she can prevent using marijuana in the future due to her mental illness  · Will consult Psychiatry discussed with psychiatry discontinue Zyprexa placed on Latuda 40 mg daily  She was set up outpatient with psychotherapy
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
## 2022-12-03 NOTE — ASSESSMENT & PLAN NOTE
· Had a some mild expiratory wheezing today in the morning which resolved after a dose of DuoNeb  · Continues to vape tobacco and smoke marijuana

## 2022-12-03 NOTE — PLAN OF CARE
Problem: Potential for Falls  Goal: Patient will remain free of falls  Description: INTERVENTIONS:  - Educate patient/family on patient safety including physical limitations  - Instruct patient to call for assistance with activity   - Consult OT/PT to assist with strengthening/mobility   - Keep Call bell within reach  - Keep bed low and locked with side rails adjusted as appropriate  - Keep care items and personal belongings within reach  - Initiate and maintain comfort rounds  - Make Fall Risk Sign visible to staff  - Offer Toileting every 2 Hours, in advance of need  - Initiate/Maintain bed/chair alarm  - Obtain necessary fall risk management equipment:   - Apply yellow socks and bracelet for high fall risk patients  - Consider moving patient to room near nurses station  Outcome: Progressing     Problem: GASTROINTESTINAL - ADULT  Goal: Minimal or absence of nausea and/or vomiting  Description: INTERVENTIONS:  - Administer IV fluids if ordered to ensure adequate hydration  - Maintain NPO status until nausea and vomiting are resolved  - Nasogastric tube if ordered  - Administer ordered antiemetic medications as needed  - Provide nonpharmacologic comfort measures as appropriate  - Advance diet as tolerated, if ordered  - Consider nutrition services referral to assist patient with adequate nutrition and appropriate food choices  Outcome: Not Progressing

## 2022-12-03 NOTE — ASSESSMENT & PLAN NOTE
· 189/114  · Blood pressure still uncontrolled previous she was on Norvasc 5 mg daily then was taken off is her blood pressure has been controlled now it could be situational due to her vomiting but for now will place on 5 mg of Norvasc and see how she does  · 12/3-a blood pressure improved after starting Norvasc discussed with her that I will continue her Norvasc as an outpatient follow-up in 1 week with primary care physician to check up on her blood pressure

## 2022-12-03 NOTE — NURSING NOTE
Patient states having nausea  patient asked for nausea medication  Patient refusing IV fluids at this time and wants to shower  Upon entering room, patient in the shower will hold off on zofran at this time

## 2022-12-05 NOTE — UTILIZATION REVIEW
NOTIFICATION OF INPATIENT ADMISSION   AUTHORIZATION REQUEST   SERVICING FACILITY:   [address] [place] [address]  Anju Rios [address] [place] [address], [address]  Tax ID: [id number]  NPI: [id number] ATTENDING PROVIDER:  Attending Name and NPI#: Lee Ann Au Md [[id number]]  Address: [facility]  Anju Rios [address] [place] [address], [facility] [address] [place]  Phone: [phone]   ADMISSION INFORMATION:  Place of Service: [address] [place] [address] Code: 21  Inpatient Admission Date/Time: 11/30/22  7:24 PM  Discharge Date/Time: 12/3/2022  1:14 PM  Admitting Diagnosis Code/Description:  Vomiting [R11 10]  LEYDA (acute kidney injury) ([facility] ) [N17 9]  Cannabinoid hyperemesis syndrome [R11 2, F12 90]     UTILIZATION REVIEW CONTACT:  Miroslava Lopez, Utilization   Network Utilization Review Department  Phone: [phone]  Fax [phone]  Email: [email or web address]  Contact for approvals/pending authorizations, clinical reviews, and discharge  PHYSICIAN ADVISORY SERVICES:  Medical Necessity Denial & [employer] Review  Phone: [phone]  Fax: [phone]  Email: [email or web address]

## 2022-12-07 NOTE — UTILIZATION REVIEW
Continued Stay Review    Date 3: 12/02/2022                          Current Patient Class: Inpatient  Current Level of Care: Med/Surg    HPI:46 y o  female initially admitted on  11/30/2022     Assessment/Plan: LEYDA  Hypertensive Crisis  Baseline Cr 1 06  Continue IV flds  Monitor BMP  Consult Psych: when she goes into depression and other mental crisis she starts using Pearletha Offer 1 which makes her sick she thinks that time her mental situation is not controlling that with leading her to be overuse she is in agreement to see a psychiatrist to see if her medicines can be adjusted so she can prevent using marijuana in the future due to her mental illness  Blood pressure still uncontrolled previous she was on Norvasc 5 mg daily then was taken off is her blood pressure has been controlled now it could be situational due to her vomiting but for now will place on 5 mg of Norvasc and see how she does  12/02/2022  TeleConsult Behavioral Health:  Assessment:  Bipolar 1 disorder most recent mixed features moderate; unspecified anxiety disorder; rule out PTSD  Treatment Plan:  The patient is reluctant to continue on Zyprexa that she has experienced significant weight gain on medication previously  Therefore recommend replacing it with Latuda 40 milligrams p o  daily as mood stabilizer and for depression and anxiety  The patient is in agreement and gives informed consent for this  Recommend continuing Lamictal and trazodone at current dosing  No suicide precautions are indicated this time  The patient should resume outpatient psychiatric follow-up for medication management upon discharge  A psychotherapy component would also be extremely beneficial for this patient upon discharge  Re-consult Psychiatry as needed      Vital Signs:   12/02/22 2145 99 °F (37 2 °C) 79 20 142/68 93 90 % None (Room air) Lying   12/02/22 2030 -- -- -- -- -- -- None (Room air) --   12/02/22 1528 98 8 °F (37 1 °C) 80 22 153/98 116 92 % None (Room air) Sitting   12/02/22 0900 -- -- -- -- -- -- None (Room air) --   12/02/22 0813 97 5 °F (36 4 °C) 68 18 164/103 Abnormal  123 96 % -- --   12/02/22 0008 98 2 °F (36 8 °C) 86 18 140/70 -- 96 % None (Room air) Lying     Date and Time Eye Opening Best Verbal Response Best Motor Response [place] Coma Scale Score   12/02/22 2030 4 5 6 15       Pertinent Labs/Diagnostic Results:     Results from last 7 days   Lab Units 12/03/22  0647 12/01/22  0631 11/30/22  1845   WBC Thousand/uL 7 50 8 46 13 21*   HEMOGLOBIN g/dL 11 7 10 4* 13 1   HEMATOCRIT % 38 2 33 9* 42 7   PLATELETS Thousands/uL 366 348 601*   NEUTROS ABS Thousands/µL 3 60  --  10 12*     Results from last 7 days   Lab Units 12/03/22  0530 12/02/22  0528 12/01/22 0631 11/30/22  1845   SODIUM mmol/L 135 136 136 132*   POTASSIUM mmol/L 3 3* 3 4* 3 6 3 9   CHLORIDE mmol/L 101 102 102 93*   CO2 mmol/L 27 24 24 22   ANION GAP mmol/L 7 10 10 17*   BUN mg/dL 21 29* 38* 48*   CREATININE mg/dL 0 86 1 14 1 04 2 15*   EGFR ml/min/1 73sq m 82 58 65 27   CALCIUM mg/dL 8 3 8 8 8 2* 9 7   MAGNESIUM mg/dL  --   --  2 5  --      Results from last 7 days   Lab Units 11/30/22  1845   AST U/L 12   ALT U/L 20   ALK PHOS U/L 84   TOTAL PROTEIN g/dL 10 6*   ALBUMIN g/dL 4 9   TOTAL BILIRUBIN mg/dL 0 49     Results from last 7 days   Lab Units 12/03/22  0530 12/02/22  0528 12/01/22 0631 11/30/22  1845   GLUCOSE RANDOM mg/dL 108 122 108 143*     BETA-HYDROXYBUTYRATE   Date Value Ref Range Status   08/28/2022 0 5 <0 6 mmol/L Final      Results from last 7 days   Lab Units 11/30/22  1845   PROTIME seconds 13 3   INR  1 00   PTT seconds 25     Results from last 7 days   Lab Units 11/30/22  1845   LACTIC ACID mmol/L 1 8     Results from last 7 days   Lab Units 11/30/22  1845   LIPASE u/L 156     Results from last 7 days   Lab Units 12/01/22  0226   CLARITY UA  Clear   COLOR UA  Yellow   SPEC GRAV UA  >=1 030   PH UA  5 5   GLUCOSE UA mg/dl Negative   KETONES UA mg/dl Negative BLOOD UA  Negative   PROTEIN UA mg/dl 30 (1+)*   NITRITE UA  Negative   BILIRUBIN UA  Small*   UROBILINOGEN UA E U /dl 0 2   LEUKOCYTES UA  Negative   WBC UA /hpf 2-4   RBC UA /hpf 0-1*   BACTERIA UA /hpf Occasional   EPITHELIAL CELLS WET PREP /hpf Occasional   MUCUS THREADS  Innumerable*     Medications:   Medication Dose Route Frequency   • amLODIPine  5 mg Oral Daily   • bacitracin  1 large application Topical BID   • enoxaparin  40 mg Subcutaneous Daily   • hydrOXYzine HCL  50 mg Oral 4x Daily PRN   X 2 doses 12/2   • ipratropium-albuterol  3 mL Nebulization Q6H PRN   • lamoTRIgine  200 mg Oral HS   • LORazepam  1 mg Intravenous Q6H PRN   • multi-electrolyte  50 mL/hr Intravenous Continuous   • OLANZapine  5 mg Oral HS   • ondansetron  4 mg Intravenous Q6H PRN    X 1 dose 12/2   • pantoprazole  40 mg Oral Early Morning   • potassium chloride  40 mEq Oral Once   • traZODone  50 mg Oral HS   metoclopramide (REGLAN) injection 10 mg  Dose: 10 mg    X 2 dose 12/2  Freq: Every 6 hours PRN Route: IV  PRN Reason: nausea  Start: 12/01/22 1130 End: 12/02/22 0930    Discharge Plan: D    Network Utilization Review Department  ATTENTION: Please call with any questions or concerns to [phone] and carefully listen to the prompts so that you are directed to the right person  All voicemails are confidential   Mari Young all requests for admission clinical reviews, approved or denied determinations and any other requests to dedicated fax number below belonging to the campus where the patient is receiving treatment   List of dedicated fax numbers for the Facilities:  [address] DENIALS (Administrative/Medical Necessity) [phone]   [address] [place] [address] (Maternity/NICU/Pediatrics) Yazmin Martinez [facility] [phone]   [facility] [phone]   [facility] [phone]   [facility] [address] [place] [address] Miguel [facility]les [facility] [address] [facility] 134 814 [facility] [phone]

## 2023-08-29 ENCOUNTER — HOSPITAL ENCOUNTER (INPATIENT)
Facility: HOSPITAL | Age: 48
LOS: 1 days | Discharge: LEFT BEFORE DISCHARGE | DRG: 683 | End: 2023-08-29
Attending: EMERGENCY MEDICINE | Admitting: INTERNAL MEDICINE
Payer: COMMERCIAL

## 2023-08-29 VITALS
OXYGEN SATURATION: 97 % | SYSTOLIC BLOOD PRESSURE: 138 MMHG | BODY MASS INDEX: 28.33 KG/M2 | DIASTOLIC BLOOD PRESSURE: 89 MMHG | HEIGHT: 68 IN | TEMPERATURE: 97.9 F | RESPIRATION RATE: 16 BRPM | WEIGHT: 186.95 LBS | HEART RATE: 64 BPM

## 2023-08-29 DIAGNOSIS — E83.42 HYPOMAGNESEMIA: ICD-10-CM

## 2023-08-29 DIAGNOSIS — R11.2 NAUSEA AND VOMITING, UNSPECIFIED VOMITING TYPE: Primary | ICD-10-CM

## 2023-08-29 DIAGNOSIS — N17.9 AKI (ACUTE KIDNEY INJURY) (HCC): ICD-10-CM

## 2023-08-29 PROBLEM — E87.8 ELECTROLYTE IMBALANCE: Status: ACTIVE | Noted: 2023-08-29

## 2023-08-29 LAB
ALBUMIN SERPL BCP-MCNC: 4.9 G/DL (ref 3.5–5)
ALP SERPL-CCNC: 82 U/L (ref 34–104)
ALT SERPL W P-5'-P-CCNC: 13 U/L (ref 7–52)
ANION GAP SERPL CALCULATED.3IONS-SCNC: 17 MMOL/L
AST SERPL W P-5'-P-CCNC: 14 U/L (ref 13–39)
ATRIAL RATE: 57 BPM
B-HCG SERPL-ACNC: 2 MIU/ML (ref 0–5)
BASE EX.OXY STD BLDV CALC-SCNC: 96 % (ref 60–80)
BASE EXCESS BLDV CALC-SCNC: -1.8 MMOL/L
BASOPHILS # BLD AUTO: 0.03 THOUSANDS/ÂΜL (ref 0–0.1)
BASOPHILS NFR BLD AUTO: 0 % (ref 0–1)
BILIRUB SERPL-MCNC: 0.49 MG/DL (ref 0.2–1)
BUN SERPL-MCNC: 25 MG/DL (ref 5–25)
CALCIUM SERPL-MCNC: 10.6 MG/DL (ref 8.4–10.2)
CHLORIDE SERPL-SCNC: 95 MMOL/L (ref 96–108)
CO2 SERPL-SCNC: 21 MMOL/L (ref 21–32)
CREAT SERPL-MCNC: 2.25 MG/DL (ref 0.6–1.3)
EOSINOPHIL # BLD AUTO: 0.01 THOUSAND/ÂΜL (ref 0–0.61)
EOSINOPHIL NFR BLD AUTO: 0 % (ref 0–6)
ERYTHROCYTE [DISTWIDTH] IN BLOOD BY AUTOMATED COUNT: 20.4 % (ref 11.6–15.1)
GFR SERPL CREATININE-BSD FRML MDRD: 25 ML/MIN/1.73SQ M
GLUCOSE SERPL-MCNC: 165 MG/DL (ref 65–140)
GLUCOSE SERPL-MCNC: 204 MG/DL (ref 65–140)
GLUCOSE SERPL-MCNC: 223 MG/DL (ref 65–140)
HCO3 BLDV-SCNC: 20.8 MMOL/L (ref 24–30)
HCT VFR BLD AUTO: 41.4 % (ref 34.8–46.1)
HGB BLD-MCNC: 12.8 G/DL (ref 11.5–15.4)
IMM GRANULOCYTES # BLD AUTO: 0.06 THOUSAND/UL (ref 0–0.2)
IMM GRANULOCYTES NFR BLD AUTO: 1 % (ref 0–2)
LACTATE SERPL-SCNC: 0.8 MMOL/L (ref 0.5–2)
LACTATE SERPL-SCNC: 2.7 MMOL/L (ref 0.5–2)
LYMPHOCYTES # BLD AUTO: 1.33 THOUSANDS/ÂΜL (ref 0.6–4.47)
LYMPHOCYTES NFR BLD AUTO: 11 % (ref 14–44)
MAGNESIUM SERPL-MCNC: 1.8 MG/DL (ref 1.9–2.7)
MCH RBC QN AUTO: 23.7 PG (ref 26.8–34.3)
MCHC RBC AUTO-ENTMCNC: 30.9 G/DL (ref 31.4–37.4)
MCV RBC AUTO: 77 FL (ref 82–98)
MONOCYTES # BLD AUTO: 0.56 THOUSAND/ÂΜL (ref 0.17–1.22)
MONOCYTES NFR BLD AUTO: 4 % (ref 4–12)
NEUTROPHILS # BLD AUTO: 10.71 THOUSANDS/ÂΜL (ref 1.85–7.62)
NEUTS SEG NFR BLD AUTO: 84 % (ref 43–75)
NRBC BLD AUTO-RTO: 0 /100 WBCS
O2 CT BLDV-SCNC: 19.3 ML/DL
P AXIS: 62 DEGREES
PCO2 BLDV: 29.9 MM HG (ref 42–50)
PH BLDV: 7.46 [PH] (ref 7.3–7.4)
PHOSPHATE SERPL-MCNC: 5.3 MG/DL (ref 2.7–4.5)
PLATELET # BLD AUTO: 580 THOUSANDS/UL (ref 149–390)
PMV BLD AUTO: 9.2 FL (ref 8.9–12.7)
PO2 BLDV: 151.6 MM HG (ref 35–45)
POTASSIUM SERPL-SCNC: 3.6 MMOL/L (ref 3.5–5.3)
PR INTERVAL: 172 MS
PROT SERPL-MCNC: 9.2 G/DL (ref 6.4–8.4)
QRS AXIS: 37 DEGREES
QRSD INTERVAL: 80 MS
QT INTERVAL: 430 MS
QTC INTERVAL: 418 MS
RBC # BLD AUTO: 5.41 MILLION/UL (ref 3.81–5.12)
SODIUM SERPL-SCNC: 133 MMOL/L (ref 135–147)
T WAVE AXIS: 59 DEGREES
VENTRICULAR RATE: 57 BPM
WBC # BLD AUTO: 12.7 THOUSAND/UL (ref 4.31–10.16)

## 2023-08-29 PROCEDURE — 82805 BLOOD GASES W/O2 SATURATION: CPT | Performed by: EMERGENCY MEDICINE

## 2023-08-29 PROCEDURE — 96376 TX/PRO/DX INJ SAME DRUG ADON: CPT

## 2023-08-29 PROCEDURE — 96361 HYDRATE IV INFUSION ADD-ON: CPT

## 2023-08-29 PROCEDURE — 99223 1ST HOSP IP/OBS HIGH 75: CPT | Performed by: INTERNAL MEDICINE

## 2023-08-29 PROCEDURE — 83605 ASSAY OF LACTIC ACID: CPT | Performed by: EMERGENCY MEDICINE

## 2023-08-29 PROCEDURE — 83735 ASSAY OF MAGNESIUM: CPT | Performed by: EMERGENCY MEDICINE

## 2023-08-29 PROCEDURE — 99284 EMERGENCY DEPT VISIT MOD MDM: CPT

## 2023-08-29 PROCEDURE — 80053 COMPREHEN METABOLIC PANEL: CPT | Performed by: EMERGENCY MEDICINE

## 2023-08-29 PROCEDURE — 96375 TX/PRO/DX INJ NEW DRUG ADDON: CPT

## 2023-08-29 PROCEDURE — 93010 ELECTROCARDIOGRAM REPORT: CPT | Performed by: INTERNAL MEDICINE

## 2023-08-29 PROCEDURE — 99285 EMERGENCY DEPT VISIT HI MDM: CPT | Performed by: EMERGENCY MEDICINE

## 2023-08-29 PROCEDURE — 36415 COLL VENOUS BLD VENIPUNCTURE: CPT | Performed by: EMERGENCY MEDICINE

## 2023-08-29 PROCEDURE — 84702 CHORIONIC GONADOTROPIN TEST: CPT | Performed by: EMERGENCY MEDICINE

## 2023-08-29 PROCEDURE — 96365 THER/PROPH/DIAG IV INF INIT: CPT

## 2023-08-29 PROCEDURE — 82948 REAGENT STRIP/BLOOD GLUCOSE: CPT

## 2023-08-29 PROCEDURE — 85025 COMPLETE CBC W/AUTO DIFF WBC: CPT | Performed by: EMERGENCY MEDICINE

## 2023-08-29 PROCEDURE — 84100 ASSAY OF PHOSPHORUS: CPT | Performed by: EMERGENCY MEDICINE

## 2023-08-29 PROCEDURE — 93005 ELECTROCARDIOGRAM TRACING: CPT

## 2023-08-29 PROCEDURE — NC001 PR NO CHARGE

## 2023-08-29 RX ORDER — SODIUM CHLORIDE, SODIUM GLUCONATE, SODIUM ACETATE, POTASSIUM CHLORIDE, MAGNESIUM CHLORIDE, SODIUM PHOSPHATE, DIBASIC, AND POTASSIUM PHOSPHATE .53; .5; .37; .037; .03; .012; .00082 G/100ML; G/100ML; G/100ML; G/100ML; G/100ML; G/100ML; G/100ML
125 INJECTION, SOLUTION INTRAVENOUS CONTINUOUS
Status: DISCONTINUED | OUTPATIENT
Start: 2023-08-29 | End: 2023-08-29 | Stop reason: HOSPADM

## 2023-08-29 RX ORDER — HYDROMORPHONE HCL IN WATER/PF 6 MG/30 ML
0.2 PATIENT CONTROLLED ANALGESIA SYRINGE INTRAVENOUS ONCE
Status: COMPLETED | OUTPATIENT
Start: 2023-08-29 | End: 2023-08-29

## 2023-08-29 RX ORDER — MAGNESIUM SULFATE HEPTAHYDRATE 40 MG/ML
2 INJECTION, SOLUTION INTRAVENOUS ONCE
Status: COMPLETED | OUTPATIENT
Start: 2023-08-29 | End: 2023-08-29

## 2023-08-29 RX ORDER — METOCLOPRAMIDE HYDROCHLORIDE 5 MG/ML
10 INJECTION INTRAMUSCULAR; INTRAVENOUS ONCE
Status: COMPLETED | OUTPATIENT
Start: 2023-08-29 | End: 2023-08-29

## 2023-08-29 RX ORDER — AMLODIPINE BESYLATE 5 MG/1
5 TABLET ORAL DAILY
Status: DISCONTINUED | OUTPATIENT
Start: 2023-08-29 | End: 2023-08-29 | Stop reason: HOSPADM

## 2023-08-29 RX ORDER — ACETAMINOPHEN 325 MG/1
650 TABLET ORAL EVERY 6 HOURS PRN
Status: DISCONTINUED | OUTPATIENT
Start: 2023-08-29 | End: 2023-08-29 | Stop reason: HOSPADM

## 2023-08-29 RX ORDER — LURASIDONE HYDROCHLORIDE 20 MG/1
40 TABLET, FILM COATED ORAL
Status: DISCONTINUED | OUTPATIENT
Start: 2023-08-29 | End: 2023-08-29 | Stop reason: HOSPADM

## 2023-08-29 RX ORDER — DROPERIDOL 2.5 MG/ML
2.5 INJECTION, SOLUTION INTRAMUSCULAR; INTRAVENOUS ONCE
Status: COMPLETED | OUTPATIENT
Start: 2023-08-29 | End: 2023-08-29

## 2023-08-29 RX ORDER — TRAZODONE HYDROCHLORIDE 50 MG/1
50 TABLET ORAL
Status: DISCONTINUED | OUTPATIENT
Start: 2023-08-29 | End: 2023-08-29 | Stop reason: HOSPADM

## 2023-08-29 RX ORDER — INSULIN LISPRO 100 [IU]/ML
1-6 INJECTION, SOLUTION INTRAVENOUS; SUBCUTANEOUS
Status: DISCONTINUED | OUTPATIENT
Start: 2023-08-29 | End: 2023-08-29 | Stop reason: HOSPADM

## 2023-08-29 RX ORDER — HYDROXYZINE HYDROCHLORIDE 25 MG/1
50 TABLET, FILM COATED ORAL 4 TIMES DAILY PRN
Status: DISCONTINUED | OUTPATIENT
Start: 2023-08-29 | End: 2023-08-29 | Stop reason: HOSPADM

## 2023-08-29 RX ORDER — LAMOTRIGINE 100 MG/1
200 TABLET ORAL DAILY
Status: DISCONTINUED | OUTPATIENT
Start: 2023-08-29 | End: 2023-08-29 | Stop reason: HOSPADM

## 2023-08-29 RX ORDER — LORAZEPAM 2 MG/ML
1 INJECTION INTRAMUSCULAR ONCE
Status: COMPLETED | OUTPATIENT
Start: 2023-08-29 | End: 2023-08-29

## 2023-08-29 RX ORDER — INSULIN LISPRO 100 [IU]/ML
1-5 INJECTION, SOLUTION INTRAVENOUS; SUBCUTANEOUS
Status: DISCONTINUED | OUTPATIENT
Start: 2023-08-29 | End: 2023-08-29 | Stop reason: HOSPADM

## 2023-08-29 RX ORDER — SIMETHICONE 80 MG
80 TABLET,CHEWABLE ORAL 4 TIMES DAILY PRN
Status: DISCONTINUED | OUTPATIENT
Start: 2023-08-29 | End: 2023-08-29 | Stop reason: HOSPADM

## 2023-08-29 RX ORDER — ONDANSETRON 2 MG/ML
4 INJECTION INTRAMUSCULAR; INTRAVENOUS EVERY 6 HOURS PRN
Status: DISCONTINUED | OUTPATIENT
Start: 2023-08-29 | End: 2023-08-29 | Stop reason: HOSPADM

## 2023-08-29 RX ORDER — DICYCLOMINE HYDROCHLORIDE 10 MG/1
10 CAPSULE ORAL
Status: DISCONTINUED | OUTPATIENT
Start: 2023-08-29 | End: 2023-08-29 | Stop reason: HOSPADM

## 2023-08-29 RX ADMIN — SODIUM CHLORIDE 1000 ML: 0.9 INJECTION, SOLUTION INTRAVENOUS at 11:08

## 2023-08-29 RX ADMIN — SODIUM CHLORIDE 500 ML: 0.9 INJECTION, SOLUTION INTRAVENOUS at 15:28

## 2023-08-29 RX ADMIN — ONDANSETRON 4 MG: 2 INJECTION INTRAMUSCULAR; INTRAVENOUS at 15:39

## 2023-08-29 RX ADMIN — MAGNESIUM SULFATE HEPTAHYDRATE 2 G: 40 INJECTION, SOLUTION INTRAVENOUS at 13:48

## 2023-08-29 RX ADMIN — LORAZEPAM 1 MG: 2 INJECTION INTRAMUSCULAR; INTRAVENOUS at 11:39

## 2023-08-29 RX ADMIN — INSULIN LISPRO 1 UNITS: 100 INJECTION, SOLUTION INTRAVENOUS; SUBCUTANEOUS at 16:38

## 2023-08-29 RX ADMIN — LORAZEPAM 1 MG: 2 INJECTION INTRAMUSCULAR; INTRAVENOUS at 13:10

## 2023-08-29 RX ADMIN — METOCLOPRAMIDE HYDROCHLORIDE 10 MG: 5 INJECTION INTRAMUSCULAR; INTRAVENOUS at 17:43

## 2023-08-29 RX ADMIN — SODIUM CHLORIDE 1000 ML: 0.9 INJECTION, SOLUTION INTRAVENOUS at 12:08

## 2023-08-29 RX ADMIN — SODIUM CHLORIDE, SODIUM GLUCONATE, SODIUM ACETATE, POTASSIUM CHLORIDE, MAGNESIUM CHLORIDE, SODIUM PHOSPHATE, DIBASIC, AND POTASSIUM PHOSPHATE 125 ML/HR: .53; .5; .37; .037; .03; .012; .00082 INJECTION, SOLUTION INTRAVENOUS at 15:33

## 2023-08-29 RX ADMIN — HYDROMORPHONE HYDROCHLORIDE 0.2 MG: 0.2 INJECTION, SOLUTION INTRAMUSCULAR; INTRAVENOUS; SUBCUTANEOUS at 17:49

## 2023-08-29 RX ADMIN — DROPERIDOL 2.5 MG: 2.5 INJECTION, SOLUTION INTRAMUSCULAR; INTRAVENOUS at 11:04

## 2023-08-29 NOTE — ASSESSMENT & PLAN NOTE
Presents with multiple electrolyte imbalance in the setting of intractable nausea vomiting mainly hyponatremia, hypomagnesemia, hypercalcemia likely secondary to dehydration nausea and vomiting. Continue with IV hydration. Continue to monitor BMP closely.

## 2023-08-29 NOTE — ASSESSMENT & PLAN NOTE
Baseline creatinine 0.8-1.1  Creatinine elevated at 2.25  Likely prerenal acute kidney injury in the setting of intractable nausea vomiting and poor oral intake. Follow-up on urinalysis with microscopy.   Continue to avoid hypotension and nephrotoxic medications  Continue with IV hydration  Follow intake output closely

## 2023-08-29 NOTE — H&P
[address] [facility]  H&P  Name: Shwetha Jacques 41 y.o. female I MRN: [id number]  Unit/Bed#: ED 05 I Date of Admission: 8/29/2023   Date of Service: 8/29/2023 I Hospital Day: 0      Assessment/Plan   * Cannabinoid hyperemesis syndrome  Assessment & Plan  Presents with intractable nausea vomiting likely in the setting of cannabinoid hyperemesis syndrome. Reports started taking marijuana again since her hysterectomy. Continue with IV hydration  Continue with IV anti emetics   Avoid marijuana use  Bentyl as needed for abdominal cramps      LEYDA (acute kidney injury) ([address] [facility])  Assessment & Plan  Baseline creatinine 0.8-1.1  Creatinine elevated at 2.25  Likely prerenal acute kidney injury in the setting of intractable nausea vomiting and poor oral intake. Follow-up on urinalysis with microscopy. Continue to avoid hypotension and nephrotoxic medications  Continue with IV hydration  Follow intake output closely    Electrolyte imbalance  Assessment & Plan  Presents with multiple electrolyte imbalance in the setting of intractable nausea vomiting mainly hyponatremia, hypomagnesemia, hypercalcemia likely secondary to dehydration nausea and vomiting. Continue with IV hydration. Continue to monitor BMP closely. Bipolar 1 disorder ([address] )  Assessment & Plan  Maintained on Latuda and trazodone. History of COPD  Assessment & Plan  Does not appear to have any acute exacerbation. Continue to monitor respiratory status closely. Acute hyponatremia  Assessment & Plan  Likely in the setting of intractable nausea vomiting and poor oral intake. Continue with IV hydration. Continue to follow BMP closely.   Aim for 6 to 8 mEq increase in the next 24 hours    Hypertensive crisis  Assessment & Plan  Blood pressure elevated at 173/87  Likely in the setting of anxiety and intractable vomiting  We will start as needed IV hydralazine for systolic blood pressure greater than 180  Resume amlodipine once patient able to tolerate p.o. VTE Pharmacologic Prophylaxis:   Moderate Risk (Score 3-4) - Pharmacological DVT Prophylaxis Ordered: heparin. Code Status: Full code   Discussion with family: Patient declined call to . Anticipated Length of Stay: Patient will be admitted on an inpatient basis with an anticipated length of stay of greater than 2 midnights secondary to IV hydration and management of vomiting. Total Time Spent on Date of Encounter in care of patient: 75 minutes This time was spent on one or more of the following: performing physical exam; counseling and coordination of care; obtaining or reviewing history; documenting in the medical record; reviewing/ordering tests, medications or procedures; communicating with other healthcare professionals and discussing with patient's family/caregivers. Chief Complaint: Nausea vomiting and poor oral intake    History of Present Illness:  Dann Rodriguez is a 41 y.o. female with a PMH of diabetes, hypertension, anxiety, cannabinoid use with cannabinoid hyperemesis syndrome who presents with 2 days history of intractable nausea vomiting. Reports that she had recently started using marijuana again since her robotic total laparoscopic hysterectomy in July. She started having nausea with multiple episodes of vomiting last 3 days. Also reported abdominal cramping pain. Denied any sick contacts. Denied any fever or chills. Denied any blood in the stools or vomitus. Also feels very anxious. Denies any suicidal thoughts or ideation. Reported decreased urine output and poor oral intake. Review of Systems:  Review of Systems   Constitutional: Positive for activity change, appetite change and fatigue. HENT: Negative. Eyes: Negative. Respiratory: Negative. Cardiovascular: Negative. Gastrointestinal: Positive for abdominal pain, nausea and vomiting. Endocrine: Negative. Genitourinary: Positive for decreased urine volume. Musculoskeletal: Negative. Skin: Negative. Allergic/Immunologic: Negative. Neurological: Positive for weakness. Hematological: Negative. Psychiatric/Behavioral: The patient is nervous/anxious. Past Medical and Surgical History:   Past Medical History:   Diagnosis Date   • Cannabinoid hyperemesis syndrome    • COPD (chronic obstructive pulmonary disease) (HCC)    • Diabetes mellitus ([address])    • Hyperlipidemia    • Hypertension    • Psychiatric disorder        History reviewed. No pertinent surgical history. Meds/Allergies:  Prior to Admission medications    Medication Sig Start Date End Date Taking? Authorizing Provider   amLODIPine (NORVASC) 5 mg tablet Take 1 tablet (5 mg total) by mouth daily 12/3/22   Ashu Taylor MD   dexlansoprazole (DEXILANT) 60 MG capsule Take 60 mg by mouth daily    Historical Provider, MD   haloperidol (HALDOL) 5 mg tablet Take 5 mg by mouth daily as needed 6/3/22   Historical Provider, MD   hydrOXYzine HCL (ATARAX) 50 mg tablet Take 50 mg by mouth 4 (four) times a day as needed 4/29/22   Historical Provider, MD   lamoTRIgine (LaMICtal) 200 MG tablet Take 200 mg by mouth    Historical Provider, MD   lurasidone (LATUDA) 40 mg tablet Take 1 tablet (40 mg total) by mouth daily with dinner 12/3/22   Ashu Taylor MD   [facility]ron [facility]) 4 mg tablet Take 1 tablet (4 mg total) by mouth every 8 (eight) hours as needed for nausea or vomiting 12/3/22   Ashu Taylor MD   polyethylene glycol [facility]) 17 GM/SCOOP powder Take 17 g by mouth daily as needed (constipation) 12/3/22   Ashu Taylor MD   traZODone (DESYREL) 50 mg tablet Take 50 mg by mouth daily at bedtime    Historical Provider, MD   ondansetron (Zofran ODT) 4 mg disintegrating tablet Take 1 tablet (4 mg total) by mouth every 6 (six) hours as needed for nausea or vomiting 10/6/21 8/28/22  Miguelito Mei PA-C     I have reviewed home medications using recent Epic encounter.     Allergies: No Known Allergies    Social History:  Marital Status: /Civil Union   Substance Use History:   Social History     Substance and Sexual Activity   Alcohol Use Not Currently     Social History     Tobacco Use   Smoking Status Former   • Packs/day: 1.00   • Types: Cigarettes   Smokeless Tobacco Never     Social History     Substance and Sexual Activity   Drug Use Yes   • Types: Marijuana       Family History:  History reviewed. No pertinent family history. Physical Exam:     Vitals:   Blood Pressure: 147/87 (08/29/23 1400)  Pulse: 77 (08/29/23 1400)  Temperature: 97.8 °F (36.6 °C) (08/29/23 1100)  Temp Source: Temporal (08/29/23 1100)  Respirations: 16 (08/29/23 1300)  SpO2: 93 % (08/29/23 1400)    Physical Exam  Constitutional:       Appearance: She is ill-appearing. HENT:      Head: Normocephalic and atraumatic. Mouth/Throat:      Mouth: Mucous membranes are dry. Eyes:      Pupils: Pupils are equal, round, and reactive to light. Cardiovascular:      Rate and Rhythm: Normal rate and regular rhythm. Pulses: Normal pulses. Pulmonary:      Effort: Pulmonary effort is normal.   Abdominal:      General: Abdomen is flat. There is no distension. Palpations: Abdomen is soft. Tenderness: There is no abdominal tenderness. There is no guarding. Musculoskeletal:      Right lower leg: No edema. Skin:     General: Skin is warm. Neurological:      General: No focal deficit present. Mental Status: She is alert and oriented to person, place, and time. Mental status is at baseline.    Psychiatric:      Comments: Appears anxious          Additional Data:     Lab Results:  Results from last 7 days   Lab Units 08/29/23  1103   WBC Thousand/uL 12.70*   HEMOGLOBIN g/dL 12.8   HEMATOCRIT % 41.4   PLATELETS Thousands/uL 580*   NEUTROS PCT % 84*   LYMPHS PCT % 11*   MONOS PCT % 4   EOS PCT % 0     Results from last 7 days   Lab Units 08/29/23  1103   SODIUM mmol/L 133*   POTASSIUM mmol/L 3.6 CHLORIDE mmol/L 95*   CO2 mmol/L 21   BUN mg/dL 25   CREATININE mg/dL 2.25*   ANION GAP mmol/L 17   CALCIUM mg/dL 10.6*   ALBUMIN g/dL 4.9   TOTAL BILIRUBIN mg/dL 0.49   ALK PHOS U/L 82   ALT U/L 13   AST U/L 14   GLUCOSE RANDOM mg/dL 223*         Results from last 7 days   Lab Units 08/29/23  1110   POC GLUCOSE mg/dl 204*         Results from last 7 days   Lab Units 08/29/23  1349 08/29/23  1103   LACTIC ACID mmol/L 0.8 2.7*       Lines/Drains:  Invasive Devices     Peripheral Intravenous Line  Duration           Peripheral IV 08/29/23 Left Antecubital <1 day                    Imaging: No pertinent imaging reviewed. No orders to display       EKG and Other Studies Reviewed on Admission:   · EKG: No EKG obtained. ** Please Note: This note has been constructed using a voice recognition system.  **

## 2023-08-29 NOTE — ASSESSMENT & PLAN NOTE
Likely in the setting of intractable nausea vomiting and poor oral intake. Continue with IV hydration. Continue to follow BMP closely.   Aim for 6 to 8 mEq increase in the next 24 hours

## 2023-08-29 NOTE — ASSESSMENT & PLAN NOTE
Blood pressure elevated at 173/87  Likely in the setting of anxiety and intractable vomiting  We will start as needed IV hydralazine for systolic blood pressure greater than 180  Resume amlodipine once patient able to tolerate p.o.

## 2023-08-29 NOTE — ASSESSMENT & PLAN NOTE
Presents with intractable nausea vomiting likely in the setting of cannabinoid hyperemesis syndrome. Reports started taking marijuana again since her hysterectomy.   Continue with IV hydration  Continue with IV anti emetics   Avoid marijuana use  Bentyl as needed for abdominal cramps

## 2023-08-29 NOTE — ED PROVIDER NOTES
History  Chief Complaint   Patient presents with   • Vomiting     Patient c/o nausea, vomiting and abdominal pain since Sunday. Patient has hx of hyperemesis from marijuana use. 29-year-old female with past medical history pertinent for cannabinoid hyperemesis syndrome, diabetes, hyperlipidemia, COPD who presents to the emergency department for evaluation of nausea, vomiting and diffuse abdominal discomfort since Sunday. Patient reports that she started using marijuana again after she had a hysterectomy and was using it for pain control. Reports Zofran and Haldol usually do not work for her. Reports that she needs IV fluids. Was at [facility] just prior to arrival but states that they did not see her prompting her to come here. Reports multiple episodes of vomiting but states that there was no blood in her vomitus. No other concerns. Prior to Admission Medications   Prescriptions Last Dose Informant Patient Reported? Taking?    amLODIPine (NORVASC) 5 mg tablet   No No   Sig: Take 1 tablet (5 mg total) by mouth daily   dexlansoprazole (DEXILANT) 60 MG capsule   Yes No   Sig: Take 60 mg by mouth daily   haloperidol (HALDOL) 5 mg tablet   Yes No   Sig: Take 5 mg by mouth daily as needed   hydrOXYzine HCL (ATARAX) 50 mg tablet   Yes No   Sig: Take 50 mg by mouth 4 (four) times a day as needed   lamoTRIgine (LaMICtal) 200 MG tablet   Yes No   Sig: Take 200 mg by mouth   lurasidone (LATUDA) 40 mg tablet   No No   Sig: Take 1 tablet (40 mg total) by mouth daily with dinner   ondansetron (ZOFRAN) 4 mg tablet   No No   Sig: Take 1 tablet (4 mg total) by mouth every 8 (eight) hours as needed for nausea or vomiting   polyethylene glycol (GLYCOLAX) 17 GM/SCOOP powder   No No   Sig: Take 17 g by mouth daily as needed (constipation)   traZODone (DESYREL) 50 mg tablet   Yes No   Sig: Take 50 mg by mouth daily at bedtime      Facility-Administered Medications: None       Past Medical History:   Diagnosis Date • Cannabinoid hyperemesis syndrome    • COPD (chronic obstructive pulmonary disease) (HCC)    • Diabetes mellitus ([address])    • Hyperlipidemia    • Hypertension    • Psychiatric disorder        History reviewed. No pertinent surgical history. History reviewed. No pertinent family history. I have reviewed and agree with the history as documented. E-Cigarette/Vaping   • E-Cigarette Use Never User      E-Cigarette/Vaping Substances     Social History     Tobacco Use   • Smoking status: Former     Packs/day: 1.00     Types: Cigarettes   • Smokeless tobacco: Never   Vaping Use   • Vaping Use: Never used   Substance Use Topics   • Alcohol use: Not Currently   • Drug use: Yes     Types: Marijuana       Review of Systems   Constitutional: Negative for activity change, appetite change, chills and fever. HENT: Negative for congestion, rhinorrhea and sore throat. Eyes: Negative for visual disturbance. Respiratory: Negative for chest tightness, shortness of breath and wheezing. Cardiovascular: Negative for chest pain, palpitations and leg swelling. Gastrointestinal: Positive for abdominal pain, nausea and vomiting. Negative for constipation and diarrhea. Genitourinary: Negative for dysuria, flank pain and pelvic pain. Musculoskeletal: Negative for back pain and neck pain. Skin: Negative for rash. Neurological: Negative for weakness, numbness and headaches. Psychiatric/Behavioral: The patient is nervous/anxious. The patient is not hyperactive. Physical Exam  Physical Exam  Vitals and nursing note reviewed. Constitutional:       General: She is not in acute distress. Appearance: She is well-developed. She is not diaphoretic. HENT:      Head: Normocephalic and atraumatic. Right Ear: External ear normal.      Left Ear: External ear normal.      Nose: Nose normal.   Eyes:      Pupils: Pupils are equal, round, and reactive to light.    Cardiovascular:      Rate and Rhythm: Normal rate and regular rhythm. Pulses: Normal pulses. Heart sounds: Normal heart sounds. Pulmonary:      Effort: Pulmonary effort is normal. No respiratory distress. Breath sounds: Normal breath sounds. No wheezing or rales. Abdominal:      General: Bowel sounds are normal.      Palpations: Abdomen is soft. Tenderness: There is no abdominal tenderness. Comments: No significant tenderness to palpation   Musculoskeletal:         General: No tenderness or deformity. Normal range of motion. Cervical back: Normal range of motion and neck supple. Skin:     General: Skin is warm and dry. Capillary Refill: Capillary refill takes less than 2 seconds. Neurological:      Mental Status: She is alert and oriented to person, place, and time. Motor: No abnormal muscle tone.    Psychiatric:      Comments: Very anxious, moaning consistently         Vital Signs  ED Triage Vitals   Temperature Pulse Respirations Blood Pressure SpO2   08/29/23 1100 08/29/23 1100 08/29/23 1100 08/29/23 1111 08/29/23 1100   97.8 °F (36.6 °C) 98 20 (!) 164/104 100 %      Temp Source Heart Rate Source Patient Position - Orthostatic VS BP Location FiO2 (%)   08/29/23 1100 08/29/23 1100 -- -- --   Temporal Monitor         Pain Score       08/29/23 1149       No Pain           Vitals:    08/29/23 1111 08/29/23 1149 08/29/23 1200 08/29/23 1300   BP: (!) 164/104 164/69 153/70 (!) 173/87   Pulse:  77 77 76         Visual Acuity      ED Medications  Medications   magnesium sulfate 2 g/50 mL IVPB (premix) 2 g (2 g Intravenous New Bag 8/29/23 1348)   sodium chloride 0.9 % bolus 1,000 mL (0 mL Intravenous Stopped 8/29/23 1209)   droperidol (INAPSINE) injection 2.5 mg (2.5 mg Intravenous Given 8/29/23 1104)   LORazepam (ATIVAN) injection 1 mg (1 mg Intravenous Given 8/29/23 1139)   sodium chloride 0.9 % bolus 1,000 mL (0 mL Intravenous Stopped 8/29/23 1308)   LORazepam (ATIVAN) injection 1 mg (1 mg Intravenous Given 8/29/23 1310) Diagnostic Studies  Results Reviewed     Procedure Component Value Units Date/Time    Lactic acid 2 Hours [[id number]]  (Normal) Collected: 08/29/23 1349    Lab Status: Final result Specimen: Blood Updated: 08/29/23 1349     LACTIC ACID 0.8 mmol/L     Narrative:      Result may be elevated if tourniquet was used during collection. hCG, quantitative [[id number]]  (Normal) Collected: 08/29/23 1103    Lab Status: Final result Specimen: Blood from Arm, Left Updated: 08/29/23 1201     HCG, Quant 2 mIU/mL     Narrative:       Expected Ranges:    HCG results between 5 and 25 mIU/mL may be indicative of early pregnancy but should be interpreted in light of the total clinical presentation. HCG can rise to detectable levels in zay and post menopausal women (0-11.6 mIU/mL). Approximate               Approximate HCG  Gestation age          Concentration ( mIU/mL)  _____________          ______________________   Alexis Rosen                      HCG values  0.2-1                       5-50  1-2                           2-3                         100-5000  3-4                         500-66612  4-5                         1000-31117  5-6                         [id number]  6-8                         90527-678409  8-12                        67411-890077      Lactic acid, plasma (w/reflex if result > 2.0) [[id number]]  (Abnormal) Collected: 08/29/23 1103    Lab Status: Final result Specimen: Blood from Arm, Left Updated: 08/29/23 1142     LACTIC ACID 2.7 mmol/L     Narrative:      Result may be elevated if tourniquet was used during collection.     Comprehensive metabolic panel [[id number]]  (Abnormal) Collected: 08/29/23 1103    Lab Status: Final result Specimen: Blood from Arm, Left Updated: 08/29/23 1141     Sodium 133 mmol/L      Potassium 3.6 mmol/L      Chloride 95 mmol/L      CO2 21 mmol/L      ANION GAP 17 mmol/L      BUN 25 mg/dL      Creatinine 2.25 mg/dL      Glucose 223 mg/dL      Calcium 10.6 mg/dL AST 14 U/L      ALT 13 U/L      Alkaline Phosphatase 82 U/L      Total Protein 9.2 g/dL      Albumin 4.9 g/dL      Total Bilirubin 0.49 mg/dL      eGFR 25 ml/min/1.73sq m     Narrative:      National [employer] guidelines for Chronic Kidney Disease (CKD):   •  Stage 1 with normal or high GFR (GFR > 90 mL/min/1.73 square meters)  •  Stage 2 Mild CKD (GFR = 60-89 mL/min/1.73 square meters)  •  Stage 3A Moderate CKD (GFR = 45-59 mL/min/1.73 square meters)  •  Stage 3B Moderate CKD (GFR = 30-44 mL/min/1.73 square meters)  •  Stage 4 Severe CKD (GFR = 15-29 mL/min/1.73 square meters)  •  Stage 5 End Stage CKD (GFR <15 mL/min/1.73 square meters)  Note: GFR calculation is accurate only with a steady state creatinine    Phosphorus [[id number]]  (Abnormal) Collected: 08/29/23 1103    Lab Status: Final result Specimen: Blood from Arm, Left Updated: 08/29/23 1141     Phosphorus 5.3 mg/dL     Magnesium [[id number]]  (Abnormal) Collected: 08/29/23 1103    Lab Status: Final result Specimen: Blood from Arm, Left Updated: 08/29/23 1141     Magnesium 1.8 mg/dL     Blood gas, venous [[id number]]  (Abnormal) Collected: 08/29/23 1103    Lab Status: Final result Specimen: Blood from Arm, Left Updated: 08/29/23 1116     pH, Edwin 7.461     pCO2, Edwin 29.9 mm Hg      pO2, Ediwn 151.6 mm Hg      HCO3, Edwin 20.8 mmol/L      Base Excess, Edwin -1.8 mmol/L      O2 Content, Edwin 19.3 ml/dL      O2 HGB, VENOUS 96.0 %     CBC and differential [[id number]]  (Abnormal) Collected: 08/29/23 1103    Lab Status: Final result Specimen: Blood from Arm, Left Updated: 08/29/23 1114     WBC 12.70 Thousand/uL      RBC 5.41 Million/uL      Hemoglobin 12.8 g/dL      Hematocrit 41.4 %      MCV 77 fL      MCH 23.7 pg      MCHC 30.9 g/dL      RDW 20.4 %      MPV 9.2 fL      Platelets 875 Thousands/uL      nRBC 0 /100 WBCs      Neutrophils Relative 84 %      Immat GRANS % 1 %      Lymphocytes Relative 11 %      Monocytes Relative 4 %      Eosinophils Relative 0 %      Basophils Relative 0 %      Neutrophils Absolute 10.71 Thousands/µL      Immature Grans Absolute 0.06 Thousand/uL      Lymphocytes Absolute 1.33 Thousands/µL      Monocytes Absolute 0.56 Thousand/µL      Eosinophils Absolute 0.01 Thousand/µL      Basophils Absolute 0.03 Thousands/µL     Fingerstick Glucose (POCT) [[id number]]  (Abnormal) Collected: 08/29/23 1110    Lab Status: Final result Updated: 08/29/23 1111     POC Glucose 204 mg/dl                  No orders to display              Procedures  Procedures         ED Course          RESULTS:  Results Reviewed     Procedure Component Value Units Date/Time    Lactic acid 2 Hours [[id number]]  (Normal) Collected: 08/29/23 1349    Lab Status: Final result Specimen: Blood Updated: 08/29/23 1349     LACTIC ACID 0.8 mmol/L     Narrative:      Result may be elevated if tourniquet was used during collection. hCG, quantitative [[id number]]  (Normal) Collected: 08/29/23 1103    Lab Status: Final result Specimen: Blood from Arm, Left Updated: 08/29/23 1201     HCG, Quant 2 mIU/mL     Narrative:       Expected Ranges:    HCG results between 5 and 25 mIU/mL may be indicative of early pregnancy but should be interpreted in light of the total clinical presentation. HCG can rise to detectable levels in zay and post menopausal women (0-11.6 mIU/mL).      Approximate               Approximate HCG  Gestation age          Concentration ( mIU/mL)  _____________          ______________________   Nadia Miriam                      HCG values  0.2-1                       5-50  1-2                           2-3                         100-5000  3-4                         500-65943  4-5                         1000-42998  5-6                         74941-664370  6-8                         51382-058791  8-12                        96025-322055      Lactic acid, plasma (w/reflex if result > 2.0) [[id number]]  (Abnormal) Collected: 08/29/23 1103    Lab Status: Final result Specimen: Blood from Arm, Left Updated: 08/29/23 1142     LACTIC ACID 2.7 mmol/L     Narrative:      Result may be elevated if tourniquet was used during collection.     Comprehensive metabolic panel [[id number]]  (Abnormal) Collected: 08/29/23 1103    Lab Status: Final result Specimen: Blood from Arm, Left Updated: 08/29/23 1141     Sodium 133 mmol/L      Potassium 3.6 mmol/L      Chloride 95 mmol/L      CO2 21 mmol/L      ANION GAP 17 mmol/L      BUN 25 mg/dL      Creatinine 2.25 mg/dL      Glucose 223 mg/dL      Calcium 10.6 mg/dL      AST 14 U/L      ALT 13 U/L      Alkaline Phosphatase 82 U/L      Total Protein 9.2 g/dL      Albumin 4.9 g/dL      Total Bilirubin 0.49 mg/dL      eGFR 25 ml/min/1.73sq m     Narrative:      [place] guidelines for Chronic Kidney Disease (CKD):   •  Stage 1 with normal or high GFR (GFR > 90 mL/min/1.73 square meters)  •  Stage 2 Mild CKD (GFR = 60-89 mL/min/1.73 square meters)  •  Stage 3A Moderate CKD (GFR = 45-59 mL/min/1.73 square meters)  •  Stage 3B Moderate CKD (GFR = 30-44 mL/min/1.73 square meters)  •  Stage 4 Severe CKD (GFR = 15-29 mL/min/1.73 square meters)  •  Stage 5 End Stage CKD (GFR <15 mL/min/1.73 square meters)  Note: GFR calculation is accurate only with a steady state creatinine    Phosphorus [[id number]]  (Abnormal) Collected: 08/29/23 1103    Lab Status: Final result Specimen: Blood from Arm, Left Updated: 08/29/23 1141     Phosphorus 5.3 mg/dL     Magnesium [[id number]]  (Abnormal) Collected: 08/29/23 1103    Lab Status: Final result Specimen: Blood from Arm, Left Updated: 08/29/23 1141     Magnesium 1.8 mg/dL     Blood gas, venous [[id number]]  (Abnormal) Collected: 08/29/23 1103    Lab Status: Final result Specimen: Blood from Arm, Left Updated: 08/29/23 1116     pH, Edwin 7.461     pCO2, Edwin 29.9 mm Hg      pO2, Edwin 151.6 mm Hg      HCO3, Edwin 20.8 mmol/L      Base Excess, Edwin -1.8 mmol/L      O2 Content, Edwin 19.3 ml/dL      O2 HGB, VENOUS 96.0 %     CBC and differential [[id number]]  (Abnormal) Collected: 08/29/23 1103    Lab Status: Final result Specimen: Blood from Arm, Left Updated: 08/29/23 1114     WBC 12.70 Thousand/uL      RBC 5.41 Million/uL      Hemoglobin 12.8 g/dL      Hematocrit 41.4 %      MCV 77 fL      MCH 23.7 pg      MCHC 30.9 g/dL      RDW 20.4 %      MPV 9.2 fL      Platelets 912 Thousands/uL      nRBC 0 /100 WBCs      Neutrophils Relative 84 %      Immat GRANS % 1 %      Lymphocytes Relative 11 %      Monocytes Relative 4 %      Eosinophils Relative 0 %      Basophils Relative 0 %      Neutrophils Absolute 10.71 Thousands/µL      Immature Grans Absolute 0.06 Thousand/uL      Lymphocytes Absolute 1.33 Thousands/µL      Monocytes Absolute 0.56 Thousand/µL      Eosinophils Absolute 0.01 Thousand/µL      Basophils Absolute 0.03 Thousands/µL     Fingerstick Glucose (POCT) [[id number]]  (Abnormal) Collected: 08/29/23 1110    Lab Status: Final result Updated: 08/29/23 1111     POC Glucose 204 mg/dl           No orders to display       Vitals:    08/29/23 1111 08/29/23 1149 08/29/23 1200 08/29/23 1300   BP: (!) 164/104 164/69 153/70 (!) 173/87   TempSrc:       Pulse:  77 77 76   Resp:  16 16 16   Temp:             Medical Decision Making  82-year-old female with past medical history pertinent for cannabinoid hyperemesis syndrome, diabetes, hyperlipidemia, COPD who presents to the emergency department for evaluation of nausea, vomiting and diffuse abdominal discomfort since Sunday. Patient reports that she started using marijuana again after she had a hysterectomy and was using it for pain control. Vital signs on arrival here notable for hypertension with blood pressures in the 170s over 80s, likely secondary to patient's anxiety. Patient had exam as above. Patient was treated with IV fluids and droperidol and later Ativan for supportive care. Patient was noted to have elevated lactic acid on results and low magnesium.   Lactic acid improved after IV fluids and magnesium was repleted. Noted creatinine at 2.25 and mild leukocytosis at 12. Previous creatinine was within normal limits. Given severe LEYDA and inability to pass p.o. challenge, patient was discussed with our hospitalist, Dr. Barbara Malik, who advised admitting patient under Dr. Shanell Portillo. Patient was informed and was agreeable to plan. Amount and/or Complexity of Data Reviewed  Labs: ordered. Risk  Prescription drug management. Decision regarding hospitalization. Disposition  Final diagnoses:   Nausea and vomiting, unspecified vomiting type   Hypomagnesemia   LEYDA (acute kidney injury) ([address])     Time reflects when diagnosis was documented in both MDM as applicable and the Disposition within this note     Time User Action Codes Description Comment    8/29/2023  1:38 PM Chandni Osullivan [R11.2] Nausea and vomiting, unspecified vomiting type     8/29/2023  1:38 PM Lisa Weaver N Add [E83.42] Hypomagnesemia     8/29/2023  2:17 PM Lisakeke Weaver N Add [N17.0] Acute kidney injury (LEYDA) with acute tubular necrosis (ATN) ([address])     8/29/2023  2:17 PM Lisa Fern N Remove [N17.0] Acute kidney injury (LEYDA) with acute tubular necrosis (ATN) ([address])     8/29/2023  2:17 PM Lisa Fern N Add [N17.9] LEYDA (acute kidney injury) [facility])       ED Disposition     ED Disposition   Admit    Condition   Stable    Date/Time   Tue Aug 29, 2023  2:18 PM    Comment   Case was discussed with Dr. Barbara Malik and the patient's admission status was agreed to be Admission Status: inpatient status to the service of Dr. Shanell Portillo. Follow-up Information     Follow up With Specialties Details Why [address], [address] [facility], Nurse Practitioner   [address]  [phone]            Patient's Medications   Discharge Prescriptions    No medications on file       No discharge procedures on file.     PDMP Review     None          ED Provider  Electronically Signed by           Deysi Trujillo MD  08/29/23 [id number]

## 2023-08-29 NOTE — PLAN OF CARE
Problem: Potential for Falls  Goal: Patient will remain free of falls  Description: INTERVENTIONS:  - Educate patient/family on patient safety including physical limitations  - Instruct patient to call for assistance with activity   - Consult OT/PT to assist with strengthening/mobility   - Keep Call bell within reach  - Keep bed low and locked with side rails adjusted as appropriate  - Keep care items and personal belongings within reach  - Initiate and maintain comfort rounds  - Make Fall Risk Sign visible to staff  - Offer Toileting every 2 Hours, in advance of need  - Initiate/Maintain bed/chair alarm  - Apply yellow socks and bracelet for high fall risk patients  - Consider moving patient to room near nurses station  8/29/2023 1631 by Justin Gurrola RN  Outcome: Progressing  8/29/2023 1630 by Justin Gurrola RN  Outcome: Progressing     Problem: MOBILITY - ADULT  Goal: Maintain or return to baseline ADL function  Description: INTERVENTIONS:  - Educate patient/family on patient safety including physical limitations  - Instruct patient to call for assistance with activity   - Consult OT/PT to assist with strengthening/mobility   - Keep Call bell within reach  - Keep bed low and locked with side rails adjusted as appropriate  - Keep care items and personal belongings within reach  - Initiate and maintain comfort rounds  - Make Fall Risk Sign visible to staff  - Offer Toileting every 2 Hours, in advance of need  - Initiate/Maintain bed/chair alarm  - Apply yellow socks and bracelet for high fall risk patients  - Consider moving patient to room near nurses station  8/29/2023 1631 by Justin Gurrola RN  Outcome: Progressing  8/29/2023 1630 by Justin Gurrola RN  Outcome: Progressing    Problem: PAIN - ADULT  Goal: Verbalizes/displays adequate comfort level or baseline comfort level  Description: Interventions:  - Encourage patient to monitor pain and request assistance  - Assess pain using appropriate pain scale  - Administer analgesics based on type and severity of pain and evaluate response  - Implement non-pharmacological measures as appropriate and evaluate response  - Consider cultural and social influences on pain and pain management  - Notify physician/advanced practitioner if interventions unsuccessful or patient reports new pain  8/29/2023 1631 by Kaity Zimmerman RN  Outcome: Progressing  8/29/2023 1630 by Kaity Zimmerman RN  Outcome: Progressing     Problem: INFECTION - ADULT  Goal: Absence or prevention of progression during hospitalization  Description: INTERVENTIONS:  - Assess and monitor for signs and symptoms of infection  - Monitor lab/diagnostic results  - Monitor all insertion sites, i.e. indwelling lines, tubes, and drains  - Monitor endotracheal if appropriate and nasal secretions for changes in amount and color  - [place] appropriate cooling/warming therapies per order  - Administer medications as ordered  - Instruct and encourage patient and family to use good hand hygiene technique  - Identify and instruct in appropriate isolation precautions for identified infection/condition  8/29/2023 1631 by Kaity Zimmerman RN  Outcome: Progressing  8/29/2023 1630 by Kaity Zimmerman RN  Outcome: Progressing  Goal: Absence of fever/infection during neutropenic period  Description: INTERVENTIONS:  - Monitor WBC    8/29/2023 1631 by Kaity Zimmerman RN  Outcome: Progressing  8/29/2023 1630 by Kaity Zimmerman RN  Outcome: Progressing     Problem: SAFETY ADULT  Goal: Patient will remain free of falls  Description: INTERVENTIONS:  - Educate patient/family on patient safety including physical limitations  - Instruct patient to call for assistance with activity   - Consult OT/PT to assist with strengthening/mobility   - Keep Call bell within reach  - Keep bed low and locked with side rails adjusted as appropriate  - Keep care items and personal belongings within reach  - Initiate and maintain comfort rounds  - Make Fall Risk Sign visible to staff  - Offer Toileting every 2 Hours, in advance of need  - Initiate/Maintain bed/chair alarm  - Apply yellow socks and bracelet for high fall risk patients  - Consider moving patient to room near nurses station  8/29/2023 1631 by Neli Watkins RN  Outcome: Progressing  8/29/2023 1630 by Neli Watkins RN  Outcome: Progressing  Goal: Maintain or return to baseline ADL function  Description: INTERVENTIONS:  - Educate patient/family on patient safety including physical limitations  - Instruct patient to call for assistance with activity   - Consult OT/PT to assist with strengthening/mobility   - Keep Call bell within reach  - Keep bed low and locked with side rails adjusted as appropriate  - Keep care items and personal belongings within reach  - Initiate and maintain comfort rounds  - Make Fall Risk Sign visible to staff  - Offer Toileting every 2 Hours, in advance of need  - Initiate/Maintain bed/chair alarm  - Apply yellow socks and bracelet for high fall risk patients  - Consider moving patient to room near nurses station  8/29/2023 1631 by Neli Watkins RN  Outcome: Progressing  8/29/2023 1630 by Neli Watkins RN  Outcome: Progressing  Goal: Maintains/Returns to pre admission functional level  Description: INTERVENTIONS:  - Perform BMAT or MOVE assessment daily.   - Set and communicate daily mobility goal to care team and patient/family/caregiver.    - Collaborate with rehabilitation services on mobility goals if consulted  - Ambulate patient 2 times a day  - Out of bed to chair 2 times a day   - Out of bed for toileting  - Record patient progress and toleration of activity level   8/29/2023 1631 by Neli Watkins RN  Outcome: Progressing  8/29/2023 1630 by Neli Watkins RN  Outcome: Progressing     Problem: Knowledge Deficit  Goal: Patient/family/caregiver demonstrates understanding of disease process, treatment plan, medications, and discharge instructions  Description: Complete learning assessment and assess knowledge base. Interventions:  - Provide teaching at level of understanding  - Provide teaching via preferred learning methods  8/29/2023 1631 by Sandra Huitron RN  Outcome: Progressing  8/29/2023 1630 by Sandra Huitron RN  Outcome: Progressing     Problem: DISCHARGE PLANNING  Goal: Discharge to home or other facility with appropriate resources  Description: INTERVENTIONS:  - Identify barriers to discharge w/patient and caregiver  - Arrange for needed discharge resources and transportation as appropriate  - Identify discharge learning needs (meds, wound care, etc.)  - Arrange for interpretive services to assist at discharge as needed  - Refer to Case Management Department for coordinating discharge planning if the patient needs post-hospital services based on physician/advanced practitioner order or complex needs related to functional status, cognitive ability, or social support system  8/29/2023 1631 by Sandra Huitron RN  Outcome: Progressing  8/29/2023 1630 by Sandra Huitron RN  Outcome: Progressing     Problem: Nutrition/Hydration-ADULT  Goal: Nutrient/Hydration intake appropriate for improving, restoring or maintaining nutritional needs  Description: Monitor and assess patient's nutrition/hydration status for malnutrition. Collaborate with interdisciplinary team and initiate plan and interventions as ordered. Monitor patient's weight and dietary intake as ordered or per policy. Utilize nutrition screening tool and intervene as necessary. Determine patient's food preferences and provide high-protein, high-caloric foods as appropriate.      INTERVENTIONS:  - Monitor oral intake, urinary output, labs, and treatment plans  - Assess nutrition and hydration status and recommend course of action  - Evaluate amount of meals eaten  - Assist patient with eating if necessary   - Allow adequate time for meals  - Recommend/ encourage appropriate diets, oral nutritional supplements, and vitamin/mineral supplements  - Order, calculate, and assess calorie counts as needed  - Recommend, monitor, and adjust tube feedings and TPN/PPN based on assessed needs  - Assess need for intravenous fluids  - Provide specific nutrition/hydration education as appropriate  - Include patient/family/caregiver in decisions related to nutrition  8/29/2023 1631 by Torie Jacques RN  Outcome: Progressing  8/29/2023 1630 by Torie Jacques RN  Outcome: Progressing     Problem: GASTROINTESTINAL - ADULT  Goal: Minimal or absence of nausea and/or vomiting  Description: INTERVENTIONS:  - Administer IV fluids if ordered to ensure adequate hydration  - Maintain NPO status until nausea and vomiting are resolved  - Nasogastric tube if ordered  - Administer ordered antiemetic medications as needed  - Provide nonpharmacologic comfort measures as appropriate  - Advance diet as tolerated, if ordered  - Consider nutrition services referral to assist patient with adequate nutrition and appropriate food choices  8/29/2023 1631 by Torie Jacques RN  Outcome: Progressing  8/29/2023 1630 by Torie Jacques RN  Outcome: Progressing  Goal: Maintains or returns to baseline bowel function  Description: INTERVENTIONS:  - Assess bowel function  - Encourage oral fluids to ensure adequate hydration  - Administer IV fluids if ordered to ensure adequate hydration  - Administer ordered medications as needed  - Encourage mobilization and activity  - Consider nutritional services referral to assist patient with adequate nutrition and appropriate food choices  8/29/2023 1631 by Torie Jacques RN  Outcome: Progressing  8/29/2023 1630 by Torie Jacques RN  Outcome: Progressing

## 2023-08-29 NOTE — DISCHARGE INSTRUCTIONS
Thank you for letting us take care of you. You have been evaluated for nausea and vomiting. Please follow-up as instructed. Please return for worsening symptoms. At this time, you have no clinical evidence of symptoms or problems that will require hospitalization, however you should be evaluated soon by a primary care physician, and contact information has been provided. Follow up with your primary care physician. This is important as many medical conditions can be managed as an outpatient, in addition to routine health screening. Seeing your primary doctor often can help identify changes in the medical issue that brought you to the ED for care today. If you experience any new symptoms or acute worsening of current symptoms, please return to the ED.

## 2023-08-29 NOTE — NURSING NOTE
Since admission to floor pt c/o nausea medicated w/ zofran at 15:39. Pt vomited x2 provider notified, new one time order obtained for reglan IV. Also unable to do bladder scan. Attempted bladder scan with the unit bladder scanner multiple times with 2 different staff members operating, and bladder scanner wouldn't read, attempted bladder scan with bladder scanner from med surg 3 and it also did the same thing and would not give a number. Provider notified of inability to obtain bladder scan.

## 2023-08-30 NOTE — ASSESSMENT & PLAN NOTE
Baseline creatinine 0.8-1.1  Creatinine elevated at 2.25  Likely prerenal acute kidney injury in the setting of intractable nausea vomiting and poor oral intake. Follow-up on urinalysis with microscopy. Continue to avoid hypotension and nephrotoxic medications  Continue with IV hydration  Follow intake output closely    Patient elected to leave AMA. Provider instructed her to follow-up with PCP as outpatient to repeat BMP as soon as possible, patient verbalized understanding.

## 2023-08-30 NOTE — UTILIZATION REVIEW
NOTIFICATION OF ADMISSION DISCHARGE   This is a Notification of Discharge from [facility]. Please be advised that this patient has been discharge from our facility. Below you will find the admission and discharge date and time including the patient’s disposition. UTILIZATION REVIEW CONTACT:  Leah Addison  Utilization   Network Utilization Review Department  Phone: [phone] x carefully listen to the prompts. All voicemails are confidential.  Email: [email or web address]     ADMISSION INFORMATION  PRESENTATION DATE: 8/29/2023 10:52 AM  OBERVATION ADMISSION DATE:   INPATIENT ADMISSION DATE: 8/29/23  2:18 PM   DISCHARGE DATE: 8/29/2023  9:00 PM   DISPOSITION:Left against medical advice or discontinued care    IMPORTANT INFORMATION:  Send all requests for admission clinical reviews, approved or denied determinations and any other requests to dedicated fax number below belonging to the campus where the patient is receiving treatment.  List of dedicated fax numbers:  [place] DENIALS (Administrative/Medical Necessity) [phone] [address] [facility] (Maternity/NICU/Pediatrics) [phone]   [facility] [phone]   [facility] [phone] [address] [phone]   [address] [place] [address] [phone]   [facility] [phone]   [address] [place], [place] [address]e [address] [facility] [phone]   [address] [place] [address] [phone] [address] [facility] [phone] [address] [address] [facility] [phone]

## 2023-08-30 NOTE — DISCHARGE SUMMARY
[address] [facility]  Discharge- Joseph Mcpherson 1975, 41 y.o. female MRN: [id number]  Unit/Bed#: -01 Encounter: [id number]  Primary Care Provider: SUSAN Avendaño   Date and time admitted to hospital: 8/29/2023 10:52 AM    * Cannabinoid hyperemesis syndrome  Assessment & Plan  Presents with intractable nausea vomiting likely in the setting of cannabinoid hyperemesis syndrome. Reports started taking marijuana again since her hysterectomy. Continue with IV hydration  Continue with IV anti emetics   Avoid marijuana use  Bentyl as needed for abdominal cramps    Patient elected to leave [place] stated she was feeling better and wanted to leave. LEYDA (acute kidney injury) ([address])  Assessment & Plan  Baseline creatinine 0.8-1.1  Creatinine elevated at 2.25  Likely prerenal acute kidney injury in the setting of intractable nausea vomiting and poor oral intake. Follow-up on urinalysis with microscopy. Continue to avoid hypotension and nephrotoxic medications  Continue with IV hydration  Follow intake output closely    Patient elected to leave [place]. Provider instructed her to follow-up with PCP as outpatient to repeat BMP as soon as possible, patient verbalized understanding. Electrolyte imbalance  Assessment & Plan  Presents with multiple electrolyte imbalance in the setting of intractable nausea vomiting mainly hyponatremia, hypomagnesemia, hypercalcemia likely secondary to dehydration nausea and vomiting. Continue with IV hydration. Continue to monitor BMP closely. Patient received magnesium replacement while admitted. Left AMA. Bipolar 1 disorder ([address])  Assessment & Plan  Maintained on Latuda and trazodone. History of COPD  Assessment & Plan  Does not appear to have any acute exacerbation. Continue to monitor respiratory status closely.     Patient left AMA    Acute hyponatremia  Assessment & Plan  Likely in the setting of intractable nausea vomiting and poor oral intake. Continue with IV hydration. Continue to follow BMP closely. Aim for 6 to 8 mEq increase in the next 24 hours    Patient left AMA. Hypertensive crisis  Assessment & Plan  Blood pressure elevated at 173/87  Likely in the setting of anxiety and intractable vomiting  We will start as needed IV hydralazine for systolic blood pressure greater than 180  Resume amlodipine once patient able to tolerate p.o. Resolved      Medical Problems     Resolved Problems  Date Reviewed: 8/29/2023   None       Discharging Physician / Practitioner: Colletta Pinta [address], [address] [place] [address]  PCP: Linda Odell [address] [place] [address]  Admission Date:   Admission Orders (From admission, onward)     Ordered        08/29/23 1418  [address]  Once                      Discharge Date: 08/29/23    Consultations During Hospital Stay:  · none    Procedures Performed:   · none    Significant Findings / Test Results:   · Creatinine 2.25  · Magnesium 1.8  · Sodium 133  · Lactate 2.7->0.8    Incidental Findings:   · none    Test Results Pending at Discharge (will require follow up):   · none     Outpatient Tests Requested:  · BMP- dicussed with patient about follow-up with pcp     Complications:  none    Reason for Admission: Cannabis hyperemesis    Hospital Course:   Flaquito Palacio is a 41 y.o. female patient who originally presented to the hospital on 8/29/2023 due to intractable nausea and vomiting suspected secondary to cannabinoid hyperemesis syndrome as patient admitted to marijuana use. Patient also noted to have LEYDA creatinine was elevated 2.25 on admission thought to be likely in the setting of intractable nausea and vomiting and poor oral intake. Patient was started on IV fluids and antinausea medication. Patient noted to have hypomagnesia and this was repleted. Mild hyponatremia treated with IV fluid hydration. Mild hypercalcemia which was treated with IV fluids. Blood pressure also noted to be elevated but has since normalized.   Patient electing to leave AMA at this time stating she does not have any abdominal pain or nausea and vomiting. Explained to the patient that she has an acute kidney injury and will need to follow-up with PCP to get repeat BMP as soon as possible. Strict return instructions. Patient verbalized understanding. The patient, initially admitted to the hospital as inpatient, was discharged earlier than expected given the following: Left AMA. Please see above list of diagnoses and related plan for additional information. Condition at Discharge: stable    Discharge Day Visit / Exam:   * Please refer to separate progress note for these details *    Discussion with Family: Patient declined call to . Discharge instructions/Information to patient and family:   See after visit summary for information provided to patient and family. Provisions for Follow-Up Care:  See after visit summary for information related to follow-up care and any pertinent home health orders. Disposition:   Home    Planned Readmission: No     Discharge Statement:  I spent 40 minutes discharging the patient. This time was spent on the day of discharge. I had direct contact with the patient on the day of discharge. Greater than 50% of the total time was spent examining patient, answering all patient questions, arranging and discussing plan of care with patient as well as directly providing post-discharge instructions. Additional time then spent on discharge activities. Discharge Medications:  See after visit summary for reconciled discharge medications provided to patient and/or family.       **Please Note: This note may have been constructed using a voice recognition system**

## 2023-08-30 NOTE — UTILIZATION REVIEW
Initial Clinical Review    Admission: Date/Time/Statement:   Admission Orders (From admission, onward)     Ordered        08/29/23 1418  [address]  Once                      Orders Placed This Encounter   Procedures   • INPATIENT ADMISSION     Standing Status:   Standing     Number of Occurrences:   1     Order Specific Question:   Level of Care     Answer:   Med Surg [16]     Order Specific Question:   Estimated length of stay     Answer:   More than 2 Midnights     Order Specific Question:   Certification     Answer:   I certify that inpatient services are medically necessary for this patient for a duration of greater than two midnights. See H&P and MD Progress Notes for additional information about the patient's course of treatment. ED Arrival Information     Expected   -    Arrival   8/29/2023 10:41    Acuity   Urgent            Means of arrival   Walk-In    Escorted by   Spouse    Service   Hospitalist    Admission type   Emergency            Arrival complaint   Hypermisysis Syndrome            Chief Complaint   Patient presents with   • Vomiting     Patient c/o nausea, vomiting and abdominal pain since Sunday. Patient has hx of hyperemesis from marijuana use. Initial Presentation: 41 y.o. female to ED via walk-in from home  Present to ED with 2 days history of intractable nausea vomiting. Reports that she had recently started using marijuana again since her robotic total laparoscopic hysterectomy in July. She started having nausea with multiple episodes of vomiting last 3 days. Also reported abdominal cramping pain. Reported decreased urine output and poor oral intake.   PMHX diabetes, hypertension, anxiety, cannabinoid use   Admitted to [facility] with DX: Cannabinoid hyperemesis syndrome  on exam: hypertensive; anxious ; Cr 2.25 (baseline 0.8-1.1); hyponatremia, hypomagnesemia, hypercalcemia   PLAN: cont ivf; pain / nausea control (see below); monitor labs; f/u UA    Discharge Summary  Hospital Course:   Flaquito Palacio is a 41 y.o. female patient who originally presented to the hospital on 8/29/2023 due to intractable nausea and vomiting suspected secondary to cannabinoid hyperemesis syndrome as patient admitted to marijuana use. Patient also noted to have LEYDA creatinine was elevated 2.25 on admission thought to be likely in the setting of intractable nausea and vomiting and poor oral intake. Patient was started on IV fluids and antinausea medication. Patient noted to have hypomagnesia and this was repleted. Mild hyponatremia treated with IV fluid hydration. Mild hypercalcemia which was treated with IV fluids. Blood pressure also noted to be elevated but has since normalized. Patient electing to leave AMA at this time stating she does not have any abdominal pain or nausea and vomiting. Explained to the patient that she has an acute kidney injury and will need to follow-up with PCP to get repeat BMP as soon as possible. Strict return instructions. Patient verbalized understanding.         ED Triage Vitals   Temperature Pulse Respirations Blood Pressure SpO2   08/29/23 1100 08/29/23 1100 08/29/23 1100 08/29/23 1111 08/29/23 1100   97.8 °F (36.6 °C) 98 20 (!) 164/104 100 %      Temp Source Heart Rate Source Patient Position - Orthostatic VS BP Location FiO2 (%)   08/29/23 1100 08/29/23 1100 08/29/23 1400 08/29/23 1400 --   Temporal Monitor Lying Right arm       Pain Score       08/29/23 1149       No Pain          Wt Readings from Last 1 Encounters:   08/29/23 84.8 kg (186 lb 15.2 oz)     Additional Vital Signs:   Date/Time Temp Pulse Resp BP MAP (mmHg) SpO2 Calculated FIO2 (%) - Nasal Cannula Nasal Cannula O2 Flow Rate (L/min) O2 Device Patient Position - Orthostatic VS   08/29/23 15:09:29 97.9 °F (36.6 °C) 64 -- 138/89 105 97 % -- -- None (Room air) --   08/29/23 1400 -- 77 -- 147/87 112 93 % -- -- None (Room air) Lying   08/29/23 1300 -- 76 16 173/87 Abnormal  123 94 % -- -- None (Room air) --   08/29/23 1200 -- 77 16 153/70 99 96 % 28 2 L/min Nasal cannula --   08/29/23 1149 -- 77 16 164/69 -- 100 % -- -- None (Room air) --   08/29/23 1111 -- -- -- 164/104 Abnormal  -- -- -- -- -- --   08/29/23 1100 97.8 °F (36.6 °C) 98 20 -- -- 100 % -- -- None (Room air) --           EKG: Sinus bradycardia with sinus arrhythmia  Otherwise normal ECG  When compared with ECG of 30-NOV-2022 19:07,  Questionable change in QRS axis  T wave inversion no longer evident in Inferior leads    Pertinent Labs/Diagnostic Test Results:   No orders to display         Results from last 7 days   Lab Units 08/29/23  1103   WBC Thousand/uL 12.70*   HEMOGLOBIN g/dL 12.8   HEMATOCRIT % 41.4   PLATELETS Thousands/uL 580*   NEUTROS ABS Thousands/µL 10.71*         Results from last 7 days   Lab Units 08/29/23  1103   SODIUM mmol/L 133*   POTASSIUM mmol/L 3.6   CHLORIDE mmol/L 95*   CO2 mmol/L 21   ANION GAP mmol/L 17   BUN mg/dL 25   CREATININE mg/dL 2.25*   EGFR ml/min/1.73sq m 25   CALCIUM mg/dL 10.6*   MAGNESIUM mg/dL 1.8*   PHOSPHORUS mg/dL 5.3*     Results from last 7 days   Lab Units 08/29/23  1103   AST U/L 14   ALT U/L 13   ALK PHOS U/L 82   TOTAL PROTEIN g/dL 9.2*   ALBUMIN g/dL 4.9   TOTAL BILIRUBIN mg/dL 0.49     Results from last 7 days   Lab Units 08/29/23  1553 08/29/23  1110   POC GLUCOSE mg/dl 165* 204*     Results from last 7 days   Lab Units 08/29/23  1103   GLUCOSE RANDOM mg/dL 223*       BETA-HYDROXYBUTYRATE   Date Value Ref Range Status   08/28/2022 0.5 <0.6 mmol/L Final          Results from last 7 days   Lab Units 08/29/23  1103   PH ELADIO  7.461*   PCO2 ELADIO mm Hg 29.9*   PO2 ELADIO mm Hg 151.6*   HCO3 ELADIO mmol/L 20.8*   BASE EXC ELADIO mmol/L -1.8   O2 CONTENT ELADIO ml/dL 19.3   O2 HGB, VENOUS % 96.0*       Results from last 7 days   Lab Units 08/29/23  1349 08/29/23  1103   LACTIC ACID mmol/L 0.8 2.7*       ED Treatment:   Medication Administration from 08/29/2023 1040 to 08/29/2023 1456       Date/Time Order Dose Route Action     08/29/2023 1108 EDT sodium chloride 0.9 % bolus 1,000 mL 1,000 mL Intravenous New Bag     08/29/2023 1104 EDT droperidol (INAPSINE) injection 2.5 mg 2.5 mg Intravenous Given     08/29/2023 1139 EDT LORazepam (ATIVAN) injection 1 mg 1 mg Intravenous Given     08/29/2023 1208 EDT sodium chloride 0.9 % bolus 1,000 mL 1,000 mL Intravenous New Bag     08/29/2023 1310 EDT LORazepam (ATIVAN) injection 1 mg 1 mg Intravenous Given     08/29/2023 1348 EDT magnesium sulfate 2 g/50 mL IVPB (premix) 2 g 2 g Intravenous New Bag          Present on Admission:  • LEYDA (acute kidney injury) ([address])  • Hypertensive crisis  • Bipolar 1 disorder ([address])  • Acute hyponatremia  • Cannabinoid hyperemesis syndrome      Admitting Diagnosis: Hypomagnesemia [E83.42]  Vomiting [R11.10]  LEYDA (acute kidney injury) ([address]) [N17.9]  Nausea and vomiting, unspecified vomiting type [R11.2]     Age/Sex: 41 y.o. female     Admission Orders: SCDs; I/O; Daily wt; Clear Liquid Diet. Blood glucose checks ACHS. Scheduled Medications:  Medication   dicyclomine (BENTYL) capsule 10 mg   acetaminophen (TYLENOL) tablet 650 mg   simethicone (MYLICON) chewable tablet 80 mg   ondansetron (ZOFRAN) injection 4 mg rec'd x1   insulin lispro (HumaLOG) 100 units/mL subcutaneous injection 1-6 Units   insulin lispro (HumaLOG) 100 units/mL subcutaneous injection 1-5 Units   multi-electrolyte (PLASMALYTE-A/ISOLYTE-S PH 7.4) IV solution @ 125   traZODone (DESYREL) tablet 50 mg   lurasidone (LATUDA) tablet 40 mg   lamoTRIgine (LaMICtal) tablet 200 mg   hydrOXYzine HCL (ATARAX) tablet 50 mg   amLODIPine (NORVASC) tablet 5 mg     HYDROmorphone HCl (DILAUDID) injection 0.2 mg  Dose: 0.2 mg  Freq: Once Route: IV  Start: 08/29/23 1745 End: 08/29/23 1749    metoclopramide (REGLAN) injection 10 mg  Dose: 10 mg  Freq: Once Route: IV  Start: 08/29/23 1745 End: 08/29/23 1743    sodium chloride 0.9 % bolus 500 mL  Dose: 500 mL  Freq:  Once Route: IV  Last Dose: Stopped (08/29/23 1639)  Start: 08/29/23 1500 End: 08/29/23 1639      Network Utilization Review Department  ATTENTION: Please call with any questions or concerns to [phone] and carefully listen to the prompts so that you are directed to the right person. All voicemails are confidential.  Basilia Burris all requests for admission clinical reviews, approved or denied determinations and any other requests to dedicated fax number below belonging to the campus where the patient is receiving treatment.  List of dedicated fax numbers for the Facilities:  [place] DENIALS (Administrative/Medical Necessity) [phone] [address] [facility] (Maternity/NICU/Pediatrics) [phone]   [address] [place] [address] [phone]   [facility] [address] [facility] [phone]   [address] [facility] [id number] [place] [address] [phone]   [address] [address] [phone]

## 2023-08-30 NOTE — ASSESSMENT & PLAN NOTE
Likely in the setting of intractable nausea vomiting and poor oral intake. Continue with IV hydration. Continue to follow BMP closely. Aim for 6 to 8 mEq increase in the next 24 hours    Patient left AMA.

## 2023-08-30 NOTE — ASSESSMENT & PLAN NOTE
Presents with multiple electrolyte imbalance in the setting of intractable nausea vomiting mainly hyponatremia, hypomagnesemia, hypercalcemia likely secondary to dehydration nausea and vomiting. Continue with IV hydration. Continue to monitor BMP closely. Patient received magnesium replacement while admitted. Left AMA.

## 2023-08-30 NOTE — NURSING NOTE
Called to patient room, upon entry patient stated she would like to leave patient educated that this would be against medical advise and that she could be at risk for getting worse and that she could come back to the emergency room if symptoms returned. Sal Plan at the bedside providing education on the same topics. AMA paperwork signed. IV removed. No bleeding noted. All belongings sent with patient.

## 2023-08-30 NOTE — ASSESSMENT & PLAN NOTE
Does not appear to have any acute exacerbation. Continue to monitor respiratory status closely.     Patient left AMA

## 2023-08-30 NOTE — ASSESSMENT & PLAN NOTE
Blood pressure elevated at 173/87  Likely in the setting of anxiety and intractable vomiting  We will start as needed IV hydralazine for systolic blood pressure greater than 180  Resume amlodipine once patient able to tolerate p.o.     Resolved

## 2023-08-30 NOTE — ASSESSMENT & PLAN NOTE
Presents with intractable nausea vomiting likely in the setting of cannabinoid hyperemesis syndrome. Reports started taking marijuana again since her hysterectomy. Continue with IV hydration  Continue with IV anti emetics   Avoid marijuana use  Bentyl as needed for abdominal cramps    Patient elected to leave AMA stated she was feeling better and wanted to leave.

## 2025-05-16 ENCOUNTER — DOCTOR'S OFFICE (OUTPATIENT)
Dept: URBAN - NONMETROPOLITAN AREA CLINIC 1 | Facility: CLINIC | Age: 50
Setting detail: OPHTHALMOLOGY
End: 2025-05-16
Payer: COMMERCIAL

## 2025-05-16 ENCOUNTER — RX ONLY (RX ONLY)
Age: 50
End: 2025-05-16

## 2025-05-16 DIAGNOSIS — G43.809: ICD-10-CM

## 2025-05-16 DIAGNOSIS — D21.0: ICD-10-CM

## 2025-05-16 DIAGNOSIS — E11.9: ICD-10-CM

## 2025-05-16 DIAGNOSIS — H10.13: ICD-10-CM

## 2025-05-16 PROCEDURE — 99213 OFFICE O/P EST LOW 20 MIN: CPT | Performed by: OPHTHALMOLOGY

## 2025-05-16 ASSESSMENT — REFRACTION_AUTOREFRACTION
OS_AXIS: 068
OD_SPHERE: -0.75
OS_SPHERE: -1.00
OD_AXIS: 107
OS_CYLINDER: -1.00
OD_CYLINDER: -1.00

## 2025-05-16 ASSESSMENT — REFRACTION_CURRENTRX
OD_CYLINDER: -1.25
OS_VPRISM_DIRECTION: SV
OS_OVR_VA: 20/
OS_CYLINDER: -1.25
OD_SPHERE: PLANO
OS_AXIS: 065
OD_AXIS: 108
OD_OVR_VA: 20/
OD_VPRISM_DIRECTION: SV
OS_SPHERE: -0.25

## 2025-05-16 ASSESSMENT — REFRACTION_MANIFEST
OS_VA2: 20/20
OD_SPHERE: -0.25
OS_VA1: 20/20
OS_AXIS: 070
OD_VA1: 20/20
OD_CYLINDER: -1.25
OS_ADD: +1.75
OD_ADD: +1.75
OS_SPHERE: -0.25
OD_AXIS: 110
OS_CYLINDER: -1.50
OD_VA2: 20/20

## 2025-05-16 ASSESSMENT — VISUAL ACUITY
OS_BCVA: 20/25-2
OD_BCVA: 20/25-2

## 2025-05-16 ASSESSMENT — KERATOMETRY
OD_AXISANGLE_DEGREES: 035
OD_K2POWER_DIOPTERS: 45.50
OS_K1POWER_DIOPTERS: 44.75
OD_K1POWER_DIOPTERS: 45.00
OS_K2POWER_DIOPTERS: 45.50
OS_AXISANGLE_DEGREES: 151

## 2025-05-16 ASSESSMENT — TONOMETRY
OS_IOP_MMHG: 13
OD_IOP_MMHG: 13

## 2025-05-20 ENCOUNTER — DOCTOR'S OFFICE (OUTPATIENT)
Dept: URBAN - NONMETROPOLITAN AREA CLINIC 1 | Facility: CLINIC | Age: 50
Setting detail: OPHTHALMOLOGY
End: 2025-05-20
Payer: COMMERCIAL

## 2025-05-20 DIAGNOSIS — D21.0: ICD-10-CM

## 2025-05-20 PROBLEM — G43.809: Status: ACTIVE | Noted: 2025-05-16

## 2025-05-20 PROBLEM — H10.13 ALLERGIC CONJUNCTIVITS; BOTH EYES: Status: ACTIVE | Noted: 2025-05-16

## 2025-05-20 PROCEDURE — 99214 OFFICE O/P EST MOD 30 MIN: CPT | Performed by: OPHTHALMOLOGY

## 2025-05-20 PROCEDURE — 92285 EXTERNAL OCULAR PHOTOGRAPHY: CPT | Performed by: OPHTHALMOLOGY

## 2025-05-20 ASSESSMENT — CONFRONTATIONAL VISUAL FIELD TEST (CVF)
OD_FINDINGS: FULL
OS_FINDINGS: FULL

## 2025-05-21 ASSESSMENT — REFRACTION_MANIFEST
OD_VA2: 20/20
OD_VA1: 20/20
OD_ADD: +1.75
OS_CYLINDER: -1.50
OD_AXIS: 110
OS_ADD: +1.75
OD_SPHERE: -0.25
OS_VA2: 20/20
OS_SPHERE: -0.25
OS_VA1: 20/20
OD_CYLINDER: -1.25
OS_AXIS: 070

## 2025-05-21 ASSESSMENT — REFRACTION_AUTOREFRACTION
OS_CYLINDER: -1.00
OD_CYLINDER: -1.00
OD_AXIS: 103
OS_AXIS: 072
OS_SPHERE: -1.00
OD_SPHERE: -0.75

## 2025-05-21 ASSESSMENT — REFRACTION_CURRENTRX
OD_AXIS: 108
OD_VPRISM_DIRECTION: SV
OD_OVR_VA: 20/
OD_SPHERE: PLANO
OS_VPRISM_DIRECTION: SV
OS_CYLINDER: -1.25
OS_AXIS: 065
OS_SPHERE: -0.25
OS_OVR_VA: 20/
OD_CYLINDER: -1.25

## 2025-05-21 ASSESSMENT — VISUAL ACUITY
OS_BCVA: 20/20
OD_BCVA: 20/20-1

## 2025-05-21 ASSESSMENT — KERATOMETRY
OD_K2POWER_DIOPTERS: 45.00
OS_K2POWER_DIOPTERS: 45.25
OD_AXISANGLE_DEGREES: 015
OS_K1POWER_DIOPTERS: 44.75
OS_AXISANGLE_DEGREES: 144
OD_K1POWER_DIOPTERS: 44.50

## 2025-07-24 ASSESSMENT — REFRACTION_CURRENTRX
OD_OVR_VA: 20/
OS_SPHERE: -0.25
OD_AXIS: 108
OD_VPRISM_DIRECTION: SV
OS_VPRISM_DIRECTION: SV
OS_CYLINDER: -1.25
OD_SPHERE: PLANO
OS_AXIS: 065
OS_OVR_VA: 20/
OD_CYLINDER: -1.25

## 2025-07-24 ASSESSMENT — KERATOMETRY
OS_AXISANGLE_DEGREES: 144
OD_K2POWER_DIOPTERS: 45.00
OS_K1POWER_DIOPTERS: 44.75
OD_K1POWER_DIOPTERS: 44.50
OS_K2POWER_DIOPTERS: 45.25
OD_AXISANGLE_DEGREES: 015

## 2025-07-24 ASSESSMENT — REFRACTION_MANIFEST
OS_ADD: +1.75
OD_CYLINDER: -1.25
OD_AXIS: 110
OD_ADD: +1.75
OD_VA1: 20/20
OD_VA2: 20/20
OS_AXIS: 070
OD_SPHERE: -0.25
OS_VA2: 20/20
OS_SPHERE: -0.25
OS_VA1: 20/20
OS_CYLINDER: -1.50

## 2025-08-28 ENCOUNTER — AMBUL SURGICAL CARE (OUTPATIENT)
Dept: URBAN - NONMETROPOLITAN AREA SURGERY 1 | Facility: SURGERY | Age: 50
Setting detail: OPHTHALMOLOGY
End: 2025-08-28
Payer: COMMERCIAL

## 2025-08-28 DIAGNOSIS — D21.0: ICD-10-CM

## 2025-08-28 PROCEDURE — 67840 REMOVE EYELID LESION: CPT | Mod: RT | Performed by: OPHTHALMOLOGY

## 2025-08-28 PROCEDURE — G8907 PT DOC NO EVENTS ON DISCHARG: HCPCS | Mod: 50 | Performed by: OPHTHALMOLOGY

## 2025-08-28 PROCEDURE — 67840 REMOVE EYELID LESION: CPT | Mod: LT | Performed by: OPHTHALMOLOGY

## 2025-08-28 PROCEDURE — G8918 PT W/O PREOP ORDER IV AB PRO: HCPCS | Mod: 50 | Performed by: OPHTHALMOLOGY
